# Patient Record
Sex: FEMALE | Race: OTHER | NOT HISPANIC OR LATINO
[De-identification: names, ages, dates, MRNs, and addresses within clinical notes are randomized per-mention and may not be internally consistent; named-entity substitution may affect disease eponyms.]

---

## 2017-02-27 ENCOUNTER — TRANSCRIPTION ENCOUNTER (OUTPATIENT)
Age: 42
End: 2017-02-27

## 2017-05-03 ENCOUNTER — TRANSCRIPTION ENCOUNTER (OUTPATIENT)
Age: 42
End: 2017-05-03

## 2018-01-11 ENCOUNTER — TRANSCRIPTION ENCOUNTER (OUTPATIENT)
Age: 43
End: 2018-01-11

## 2018-05-28 ENCOUNTER — TRANSCRIPTION ENCOUNTER (OUTPATIENT)
Age: 43
End: 2018-05-28

## 2019-12-12 ENCOUNTER — TRANSCRIPTION ENCOUNTER (OUTPATIENT)
Age: 44
End: 2019-12-12

## 2020-05-17 ENCOUNTER — TRANSCRIPTION ENCOUNTER (OUTPATIENT)
Age: 45
End: 2020-05-17

## 2021-11-10 ENCOUNTER — RESULT REVIEW (OUTPATIENT)
Age: 46
End: 2021-11-10

## 2022-06-13 ENCOUNTER — EMERGENCY (EMERGENCY)
Facility: HOSPITAL | Age: 47
LOS: 0 days | Discharge: ANOTHER HOSPITAL | End: 2022-06-14
Attending: STUDENT IN AN ORGANIZED HEALTH CARE EDUCATION/TRAINING PROGRAM | Admitting: STUDENT IN AN ORGANIZED HEALTH CARE EDUCATION/TRAINING PROGRAM
Payer: COMMERCIAL

## 2022-06-13 VITALS
OXYGEN SATURATION: 100 % | TEMPERATURE: 98 F | RESPIRATION RATE: 18 BRPM | DIASTOLIC BLOOD PRESSURE: 57 MMHG | SYSTOLIC BLOOD PRESSURE: 121 MMHG | HEART RATE: 97 BPM

## 2022-06-13 VITALS
RESPIRATION RATE: 18 BRPM | OXYGEN SATURATION: 99 % | HEART RATE: 80 BPM | DIASTOLIC BLOOD PRESSURE: 70 MMHG | WEIGHT: 220.02 LBS | SYSTOLIC BLOOD PRESSURE: 121 MMHG | TEMPERATURE: 99 F

## 2022-06-13 DIAGNOSIS — R10.9 UNSPECIFIED ABDOMINAL PAIN: ICD-10-CM

## 2022-06-13 DIAGNOSIS — U07.1 COVID-19: ICD-10-CM

## 2022-06-13 DIAGNOSIS — Z98.84 BARIATRIC SURGERY STATUS: ICD-10-CM

## 2022-06-13 DIAGNOSIS — R11.2 NAUSEA WITH VOMITING, UNSPECIFIED: ICD-10-CM

## 2022-06-13 DIAGNOSIS — K95.89 OTHER COMPLICATIONS OF OTHER BARIATRIC PROCEDURE: ICD-10-CM

## 2022-06-13 LAB
ALBUMIN SERPL ELPH-MCNC: 3.9 G/DL — SIGNIFICANT CHANGE UP (ref 3.5–5.2)
ALP SERPL-CCNC: 206 U/L — HIGH (ref 30–115)
ALT FLD-CCNC: 24 U/L — SIGNIFICANT CHANGE UP (ref 0–41)
ANION GAP SERPL CALC-SCNC: 19 MMOL/L — HIGH (ref 7–14)
APPEARANCE UR: ABNORMAL
APTT BLD: 30.8 SEC — SIGNIFICANT CHANGE UP (ref 27–39.2)
AST SERPL-CCNC: 35 U/L — SIGNIFICANT CHANGE UP (ref 0–41)
BACTERIA # UR AUTO: NEGATIVE — SIGNIFICANT CHANGE UP
BASOPHILS # BLD AUTO: 0.03 K/UL — SIGNIFICANT CHANGE UP (ref 0–0.2)
BASOPHILS NFR BLD AUTO: 0.3 % — SIGNIFICANT CHANGE UP (ref 0–1)
BILIRUB SERPL-MCNC: 1.2 MG/DL — SIGNIFICANT CHANGE UP (ref 0.2–1.2)
BILIRUB UR-MCNC: ABNORMAL
BUN SERPL-MCNC: 22 MG/DL — HIGH (ref 10–20)
CALCIUM SERPL-MCNC: 9.3 MG/DL — SIGNIFICANT CHANGE UP (ref 8.5–10.1)
CHLORIDE SERPL-SCNC: 96 MMOL/L — LOW (ref 98–110)
CO2 SERPL-SCNC: 21 MMOL/L — SIGNIFICANT CHANGE UP (ref 17–32)
COLOR SPEC: YELLOW — SIGNIFICANT CHANGE UP
CREAT SERPL-MCNC: 1.2 MG/DL — SIGNIFICANT CHANGE UP (ref 0.7–1.5)
DIFF PNL FLD: NEGATIVE — SIGNIFICANT CHANGE UP
EGFR: 57 ML/MIN/1.73M2 — LOW
EOSINOPHIL # BLD AUTO: 0.03 K/UL — SIGNIFICANT CHANGE UP (ref 0–0.7)
EOSINOPHIL NFR BLD AUTO: 0.3 % — SIGNIFICANT CHANGE UP (ref 0–8)
EPI CELLS # UR: 8 /HPF — HIGH (ref 0–5)
GLUCOSE SERPL-MCNC: 118 MG/DL — HIGH (ref 70–99)
GLUCOSE UR QL: NEGATIVE — SIGNIFICANT CHANGE UP
HCG SERPL QL: NEGATIVE — SIGNIFICANT CHANGE UP
HCT VFR BLD CALC: 34.2 % — LOW (ref 37–47)
HGB BLD-MCNC: 11.5 G/DL — LOW (ref 12–16)
HYALINE CASTS # UR AUTO: 3 /LPF — SIGNIFICANT CHANGE UP (ref 0–7)
IMM GRANULOCYTES NFR BLD AUTO: 0.4 % — HIGH (ref 0.1–0.3)
INR BLD: 1.42 RATIO — HIGH (ref 0.65–1.3)
KETONES UR-MCNC: NEGATIVE — SIGNIFICANT CHANGE UP
LACTATE SERPL-SCNC: 1.5 MMOL/L — SIGNIFICANT CHANGE UP (ref 0.7–2)
LEUKOCYTE ESTERASE UR-ACNC: ABNORMAL
LIDOCAIN IGE QN: 42 U/L — SIGNIFICANT CHANGE UP (ref 7–60)
LYMPHOCYTES # BLD AUTO: 0.64 K/UL — LOW (ref 1.2–3.4)
LYMPHOCYTES # BLD AUTO: 5.8 % — LOW (ref 20.5–51.1)
MCHC RBC-ENTMCNC: 28.8 PG — SIGNIFICANT CHANGE UP (ref 27–31)
MCHC RBC-ENTMCNC: 33.6 G/DL — SIGNIFICANT CHANGE UP (ref 32–37)
MCV RBC AUTO: 85.7 FL — SIGNIFICANT CHANGE UP (ref 81–99)
MONOCYTES # BLD AUTO: 0.7 K/UL — HIGH (ref 0.1–0.6)
MONOCYTES NFR BLD AUTO: 6.4 % — SIGNIFICANT CHANGE UP (ref 1.7–9.3)
NEUTROPHILS # BLD AUTO: 9.52 K/UL — HIGH (ref 1.4–6.5)
NEUTROPHILS NFR BLD AUTO: 86.8 % — HIGH (ref 42.2–75.2)
NITRITE UR-MCNC: NEGATIVE — SIGNIFICANT CHANGE UP
NRBC # BLD: 0 /100 WBCS — SIGNIFICANT CHANGE UP (ref 0–0)
PH UR: 6 — SIGNIFICANT CHANGE UP (ref 5–8)
PLATELET # BLD AUTO: 128 K/UL — LOW (ref 130–400)
POTASSIUM SERPL-MCNC: 4.4 MMOL/L — SIGNIFICANT CHANGE UP (ref 3.5–5)
POTASSIUM SERPL-SCNC: 4.4 MMOL/L — SIGNIFICANT CHANGE UP (ref 3.5–5)
PROT SERPL-MCNC: 7.6 G/DL — SIGNIFICANT CHANGE UP (ref 6–8)
PROT UR-MCNC: ABNORMAL
PROTHROM AB SERPL-ACNC: 16.3 SEC — HIGH (ref 9.95–12.87)
RBC # BLD: 3.99 M/UL — LOW (ref 4.2–5.4)
RBC # FLD: 13.4 % — SIGNIFICANT CHANGE UP (ref 11.5–14.5)
RBC CASTS # UR COMP ASSIST: 2 /HPF — SIGNIFICANT CHANGE UP (ref 0–4)
SARS-COV-2 RNA SPEC QL NAA+PROBE: DETECTED
SODIUM SERPL-SCNC: 136 MMOL/L — SIGNIFICANT CHANGE UP (ref 135–146)
SP GR SPEC: 1.02 — SIGNIFICANT CHANGE UP (ref 1.01–1.03)
UROBILINOGEN FLD QL: ABNORMAL
WBC # BLD: 10.96 K/UL — HIGH (ref 4.8–10.8)
WBC # FLD AUTO: 10.96 K/UL — HIGH (ref 4.8–10.8)
WBC UR QL: 8 /HPF — HIGH (ref 0–5)

## 2022-06-13 PROCEDURE — 99283 EMERGENCY DEPT VISIT LOW MDM: CPT

## 2022-06-13 PROCEDURE — 74177 CT ABD & PELVIS W/CONTRAST: CPT | Mod: 26,MA

## 2022-06-13 PROCEDURE — 99285 EMERGENCY DEPT VISIT HI MDM: CPT

## 2022-06-13 PROCEDURE — 93010 ELECTROCARDIOGRAM REPORT: CPT

## 2022-06-13 RX ORDER — DIATRIZOATE MEGLUMINE 180 MG/ML
30 INJECTION, SOLUTION INTRAVESICAL ONCE
Refills: 0 | Status: COMPLETED | OUTPATIENT
Start: 2022-06-13 | End: 2022-06-13

## 2022-06-13 RX ORDER — SODIUM CHLORIDE 9 MG/ML
1000 INJECTION, SOLUTION INTRAVENOUS ONCE
Refills: 0 | Status: COMPLETED | OUTPATIENT
Start: 2022-06-13 | End: 2022-06-13

## 2022-06-13 RX ORDER — MORPHINE SULFATE 50 MG/1
4 CAPSULE, EXTENDED RELEASE ORAL ONCE
Refills: 0 | Status: DISCONTINUED | OUTPATIENT
Start: 2022-06-13 | End: 2022-06-13

## 2022-06-13 RX ORDER — ONDANSETRON 8 MG/1
4 TABLET, FILM COATED ORAL ONCE
Refills: 0 | Status: COMPLETED | OUTPATIENT
Start: 2022-06-13 | End: 2022-06-13

## 2022-06-13 RX ORDER — PIPERACILLIN AND TAZOBACTAM 4; .5 G/20ML; G/20ML
3.38 INJECTION, POWDER, LYOPHILIZED, FOR SOLUTION INTRAVENOUS ONCE
Refills: 0 | Status: COMPLETED | OUTPATIENT
Start: 2022-06-13 | End: 2022-06-13

## 2022-06-13 RX ORDER — PANTOPRAZOLE SODIUM 20 MG/1
40 TABLET, DELAYED RELEASE ORAL ONCE
Refills: 0 | Status: COMPLETED | OUTPATIENT
Start: 2022-06-13 | End: 2022-06-13

## 2022-06-13 RX ADMIN — PIPERACILLIN AND TAZOBACTAM 200 GRAM(S): 4; .5 INJECTION, POWDER, LYOPHILIZED, FOR SOLUTION INTRAVENOUS at 20:43

## 2022-06-13 RX ADMIN — ONDANSETRON 4 MILLIGRAM(S): 8 TABLET, FILM COATED ORAL at 20:43

## 2022-06-13 RX ADMIN — DIATRIZOATE MEGLUMINE 30 MILLILITER(S): 180 INJECTION, SOLUTION INTRAVESICAL at 13:44

## 2022-06-13 RX ADMIN — SODIUM CHLORIDE 1000 MILLILITER(S): 9 INJECTION, SOLUTION INTRAVENOUS at 13:14

## 2022-06-13 RX ADMIN — SODIUM CHLORIDE 1000 MILLILITER(S): 9 INJECTION, SOLUTION INTRAVENOUS at 14:42

## 2022-06-13 RX ADMIN — MORPHINE SULFATE 4 MILLIGRAM(S): 50 CAPSULE, EXTENDED RELEASE ORAL at 13:59

## 2022-06-13 RX ADMIN — MORPHINE SULFATE 4 MILLIGRAM(S): 50 CAPSULE, EXTENDED RELEASE ORAL at 20:44

## 2022-06-13 RX ADMIN — MORPHINE SULFATE 4 MILLIGRAM(S): 50 CAPSULE, EXTENDED RELEASE ORAL at 13:14

## 2022-06-13 RX ADMIN — SODIUM CHLORIDE 1000 MILLILITER(S): 9 INJECTION, SOLUTION INTRAVENOUS at 16:24

## 2022-06-13 RX ADMIN — ONDANSETRON 4 MILLIGRAM(S): 8 TABLET, FILM COATED ORAL at 13:14

## 2022-06-13 RX ADMIN — SODIUM CHLORIDE 1000 MILLILITER(S): 9 INJECTION, SOLUTION INTRAVENOUS at 17:46

## 2022-06-13 NOTE — ED PROVIDER NOTE - CLINICAL SUMMARY MEDICAL DECISION MAKING FREE TEXT BOX
will admit for post op complications. pt seen by [facility] bariatric team. case c/w [facility] surgical team by previous ED team and they accept pt for transfer. pt in stable condition for transfer.

## 2022-06-13 NOTE — CONSULT NOTE ADULT - ASSESSMENT
Assessment  47F PSH of gastric sleeve 5/18 ([facility] Dr. Mandel) presenting with 3 day history abdominal pain worst L flank area radiating up and down her back with associated NBNB emesis, no F/chills, inability to tolerate PO intake    Plan  - transfer to [facility]  - patient hemodynamically stable, afebrile no constitutional signs of sepsis   - accepting physician Dr. Mandel  - d/w Dr. Azevedo, Dr Alfaro

## 2022-06-13 NOTE — ED PROVIDER NOTE - ATTENDING CONTRIBUTION TO CARE
47-year-old female recent history of gastric sleeve surgery  on 5/18/2022 at [facility]  presenting for evaluation of left upper quadrant pain associated with nausea and vomiting for the past several days.  Patient states that she has not  felt well since surgery; shortly thereafter developed COVID, symptoms have resolved since.  Now anything she eats or drinks causes nausea and dry heaving as well as abdominal pain.  Denies any diarrhea or bloody stools,  she is passing gas from below,   last bowel movement was 3 days ago.  Denies any associated fever/ chills, chest pain/ shortness of breath or any other acute complaints. Well-nourished, well-appearing female sitting in a chair in no acute distress, PERRL, pain conjunctiva, mmm, normal work of breathing, speaking full sentences,  lungs CTA b/l, abdomen and is nondistended, +left upper quadrant tenderness to palpation, BS present, no pitting leg edema, awake and alert x3, no gross neuro deficits.  Plan: hydration, antiemetics, labs, CT abdomen pelvis, surgery consult, reassess.

## 2022-06-13 NOTE — ED PROVIDER NOTE - NS ED ROS FT
Review of Systems:  CONSTITUTIONAL: No fever, No diaphoresis, No generalized weakness  SKIN: No rash  HEMATOLOGIC: No abnormal bleeding or bruising  EYES: No eye pain, No blurred vision  ENT: No change in hearing, No sore throat, No neck pain, No rhinorrhea, No ear pain  RESPIRATORY: No shortness of breath, No cough  CARDIAC: No chest pain, No palpitations  GI:  + abdominal pain, + nausea, + vomiting, No diarrhea, No constipation, No bright red blood per rectum or melena. No flank pain  : No dysuria, frequency, hematuria  MUSCULOSKELETAL: No joint paint, No swelling, No back pain  NEUROLOGIC: No numbness, No focal weakness, No headache, No dizziness  All other systems negative, unless specified in HPI

## 2022-06-13 NOTE — CONSULT NOTE ADULT - SUBJECTIVE AND OBJECTIVE BOX
ZENON MEADOWS [id number]  47y Female    HPI: 47F PSH of gastric sleeve  ([facility] Dr. Mandel) presenting with 3 day history abdominal pain worst L flank area radiating up and down her back with associated NBNB emesis 6-10x/day. Patient states that her pain started all of a sudden on Friday night, she was eating her normal soft diet, and did not eat anything out of the ordinary. She does not have any sick contacts. No F/chills. Last BM was 4 days prior to arrival, however patient states normally she only had approx 3BM/week. Denies NSAID use or EtOH use. Of note, patient did test positive for COVID 4 days post operatively, and recovered only approx 1 week prior to arrival.    PAST MEDICAL & SURGICAL HISTORY:    MEDICATIONS  (STANDING):  piperacillin/tazobactam IVPB... 3.375 Gram(s) IV Intermittent once    MEDICATIONS  (PRN):    Allergies  No Known Allergies  Intolerances    REVIEW OF SYSTEMS  [x ] A ten-point review of systems was otherwise negative except as noted.  [ ] Due to altered mental status/intubation, subjective information were not able to be obtained from the patient. History was obtained, to the extent possible, from review of the chart and collateral sources of information.    Vital Signs Last 24 Hrs  T(C): 36.7 (2022 15:36), Max: 37.2 (2022 11:53)  T(F): 98.1 (2022 15:36), Max: 98.9 (2022 11:53)  HR: 97 (2022 15:36) (80 - 97)  BP: 121/57 (2022 15:36) (121/57 - 121/70)  BP(mean): --  RR: 18 (2022 15:36) (18 - 18)  SpO2: 100% (2022 15:36) (99% - 100%)    PHYSICAL EXAM:  GENERAL: NAD, well-appearing  CHEST/LUNG: Clear to auscultation bilaterally  HEART: Regular rate and rhythm  ABDOMEN: Soft, tender Left hemiabdomen, no rebound or guarding  EXTREMITIES:  No clubbing, cyanosis, or edema    Labs:  CAPILLARY BLOOD GLUCOSE                  11.5   10.96 )-----------( 128      ( 2022 13:24 )             34.2       Auto Neutrophil %: 86.8 % (22 @ 13:24)  Auto Immature Granulocyte %: 0.4 % (22 @ 13:24)      136  |  96<L>  |  22<H>  ----------------------------<  118<H>  4.4   |  21  |  1.2  Calcium, Total Serum: 9.3 mg/dL (22 @ 13:24)    LFTs:          7.6  | 1.2  | 35       ------------------[206     ( 2022 13:24 )  3.9  | x    | 24          Lipase:42     Amylase:x      Lactate, Blood: 1.5 mmol/L (22 @ 13:24)    Coags:  16.30  ----< 1.42    ( 2022 13:24 )     30.8     Urinalysis Basic - ( 2022 16:15 )    Color: Yellow / Appearance: Slightly Turbid / S.023 / pH: x  Gluc: x / Ketone: Negative  / Bili: Small / Urobili: 6 mg/dL   Blood: x / Protein: 30 mg/dL / Nitrite: Negative   Leuk Esterase: Small / RBC: 2 /HPF / WBC 8 /HPF   Sq Epi: x / Non Sq Epi: 8 /HPF / Bacteria: Negative    RADIOLOGY & ADDITIONAL STUDIES:  < from: CT Abdomen and Pelvis w/ Oral Cont and w/ IV Cont (22 @ 17:09) >  IMPRESSION:  Prior sleeve gastrectomy with irregularity of the suture line and   adjacent fluid collection measuring up to 6.2 cm. Findings suspicious for   contained leak and/or abscess formation.  Findings discussed with Tina Leon at 6:53 PM on 2022  < end of copied text >     ZENON MEADOWS [id number]  47y Female    HPI: 47F PSH of gastric sleeve  ([facility] Dr. Mandel) presenting with 3 day history abdominal pain worst L flank area radiating up and down her back with associated NBNB emesis 6-10x/day. Patient states that her pain started all of a sudden on Friday night, she was eating her normal soft diet, and did not eat anything out of the ordinary. She does not have any sick contacts. No F/chills. Last BM was 4 days prior to arrival, however patient states normally she only had approx 3BM/week. Denies NSAID use or EtOH use. Of note, patient did test positive for COVID 4 days post operatively, and recovered only approx 1 week prior to arrival.    PAST MEDICAL & SURGICAL HISTORY:    MEDICATIONS  (STANDING):  piperacillin/tazobactam IVPB... 3.375 Gram(s) IV Intermittent once    MEDICATIONS  (PRN):    Allergies  No Known Allergies  Intolerances    REVIEW OF SYSTEMS  [x ] A ten-point review of systems was otherwise negative except as noted.  [ ] Due to altered mental status/intubation, subjective information were not able to be obtained from the patient. History was obtained, to the extent possible, from review of the chart and collateral sources of information.    Vital Signs Last 24 Hrs  T(C): 36.7 (2022 15:36), Max: 37.2 (2022 11:53)  T(F): 98.1 (2022 15:36), Max: 98.9 (2022 11:53)  HR: 97 (2022 15:36) (80 - 97)  BP: 121/57 (2022 15:36) (121/57 - 121/70)  BP(mean): --  RR: 18 (2022 15:36) (18 - 18)  SpO2: 100% (2022 15:36) (99% - 100%)    PHYSICAL EXAM:  GENERAL: NAD, well-appearing  CHEST/LUNG: Clear to auscultation bilaterally  HEART: Regular rate and rhythm  ABDOMEN: Soft, tender Left hemiabdomen, no rebound or guarding  EXTREMITIES:  No clubbing, cyanosis, or edema    Labs:  CAPILLARY BLOOD GLUCOSE                  11.5   10.96 )-----------( 128      ( 2022 13:24 )             34.2       Auto Neutrophil %: 86.8 % (22 @ 13:24)  Auto Immature Granulocyte %: 0.4 % (22 @ 13:24)      136  |  96<L>  |  22<H>  ----------------------------<  118<H>  4.4   |  21  |  1.2  Calcium, Total Serum: 9.3 mg/dL (22 @ 13:24)    LFTs:          7.6  | 1.2  | 35       ------------------[206     ( 2022 13:24 )  3.9  | x    | 24          Lipase:42     Amylase:x      Lactate, Blood: 1.5 mmol/L (22 @ 13:24)    Coags:  16.30  ----< 1.42    ( 2022 13:24 )     30.8     Urinalysis Basic - ( 2022 16:15 )    Color: Yellow / Appearance: Slightly Turbid / S.023 / pH: x  Gluc: x / Ketone: Negative  / Bili: Small / Urobili: 6 mg/dL   Blood: x / Protein: 30 mg/dL / Nitrite: Negative   Leuk Esterase: Small / RBC: 2 /HPF / WBC 8 /HPF   Sq Epi: x / Non Sq Epi: 8 /HPF / Bacteria: Negative    RADIOLOGY & ADDITIONAL STUDIES:  < from: CT Abdomen and Pelvis w/ Oral Cont and w/ IV Cont (22 @ 17:09) >  IMPRESSION:  Prior sleeve gastrectomy with irregularity of the suture line and   adjacent fluid collection measuring up to 6.2 cm. Findings suspicious for   contained leak and/or abscess formation.  Findings discussed with Tina Leon at 6:53 PM on 2022  < end of copied text >

## 2022-06-13 NOTE — ED PROVIDER NOTE - CARE PLAN
Principal Discharge DX:	Nausea & vomiting  Secondary Diagnosis:	Post-operative complication  Secondary Diagnosis:	Abdominal pain  Secondary Diagnosis:	2019 novel coronavirus disease (COVID-19)   1

## 2022-06-13 NOTE — ED PROVIDER NOTE - PROGRESS NOTE DETAILS
AH - Surgery consulted, pending CT ED: The patient is tolerating oral contrast, Case endorsed to Dr. Hancock to follow-up labs, imaging, surgery evaluation, reassess and dispo. MADDIE BLAKELY: received sign out on this pt. Surgical team discussed acceptance of pt at [facility], as per surgeon Dr. Mandel there ([phone]). disucssed with [facility] ED--pt is accepted as an ER to ER transfer under Dr. Alirio Bravo. Discussed with charge here, will set up transport for this pt. transfer paperwork complete.  here made aware. Rn here made aware. pt s/o Dr. Smith pending transport to [facility]. hemodynamically stable at this time.

## 2022-06-13 NOTE — ED PROVIDER NOTE - PHYSICAL EXAMINATION
CONSTITUTIONAL: in mod acute distress, afebrile  SKIN: Warm, dry  HEAD: Normocephalic; atraumatic  EYES: No conjunctival injection. EOMI  ENT: No nasal discharge; oropharynx nonerythematous; airway clear  CARD:  Regular rate and rhythm  RESP: CTAB  ABD: Soft non distended, moderate TTP diffuse, no cva or flank TTP; No guarding or rebound tenderness  EXT: Normal ROM.  No clubbing or cyanosis.  No edema  NEURO: A&O x3, grossly unremarkable, no focal deficits  PSYCH: Cooperative, appropriate  *Chaperone was used during the encounter. A professional environment was maintained and discussed with patient
No

## 2022-06-13 NOTE — ED PROVIDER NOTE - NSREASONFORTRANSFER_ED_A_ED
Higher Level of Care or Service Not Available/Pre-existing Relationship/Patient Request/Consultant Request

## 2022-06-13 NOTE — CONSULT NOTE ADULT - ATTENDING COMMENTS
48yo female s/p sleeve gastrectomy 5/18/2022 ([facility]) presenting to ER with abdominal pain and emesis, no fever/chills, chest pain or shortness of breath. +bowel function. On exam, patient has left sided tenderness without peritonitis. Labs reviewed - WBC 10.96 ,lactate 1.5. CT A/P without PO contrast demonstrates irregularity of suture line and adjacent fluid collection 6.2cm suspicious for contained leak and/or abscess formation. Recommend transfer to [facility], will contact Dr. Mandel. If not able to transfer, then will admit for IV antibiotics, serial exams, NPO, IV fluids.

## 2022-06-13 NOTE — ED PROVIDER NOTE - OBJECTIVE STATEMENT
46 yo F with PMH gastric sleeve 5/18/22 at [facility] with Dr Clarke Mandel who presents with abd pain x3 days.  Diffuse, non radiating, associated with nausea and vomiting, NBNB.  Last BM 3 days ago.  Pt denies fevers, chills, chest pain, SOB, back pain, rash, diarrhea.

## 2022-06-14 RX ADMIN — PANTOPRAZOLE SODIUM 40 MILLIGRAM(S): 20 TABLET, DELAYED RELEASE ORAL at 00:06

## 2022-06-16 ENCOUNTER — INPATIENT (INPATIENT)
Facility: HOSPITAL | Age: 47
LOS: 6 days | Discharge: HOME | End: 2022-06-23
Attending: INTERNAL MEDICINE | Admitting: INTERNAL MEDICINE
Payer: COMMERCIAL

## 2022-06-16 VITALS
HEART RATE: 68 BPM | TEMPERATURE: 98 F | DIASTOLIC BLOOD PRESSURE: 83 MMHG | RESPIRATION RATE: 18 BRPM | SYSTOLIC BLOOD PRESSURE: 147 MMHG

## 2022-06-16 LAB
-  AMIKACIN: SIGNIFICANT CHANGE UP
-  AMOXICILLIN/CLAVULANIC ACID: SIGNIFICANT CHANGE UP
-  AMPICILLIN/SULBACTAM: SIGNIFICANT CHANGE UP
-  AMPICILLIN: SIGNIFICANT CHANGE UP
-  AZTREONAM: SIGNIFICANT CHANGE UP
-  CEFAZOLIN: SIGNIFICANT CHANGE UP
-  CEFEPIME: SIGNIFICANT CHANGE UP
-  CEFOXITIN: SIGNIFICANT CHANGE UP
-  CEFTRIAXONE: SIGNIFICANT CHANGE UP
-  CIPROFLOXACIN: SIGNIFICANT CHANGE UP
-  ERTAPENEM: SIGNIFICANT CHANGE UP
-  GENTAMICIN: SIGNIFICANT CHANGE UP
-  LEVOFLOXACIN: SIGNIFICANT CHANGE UP
-  MEROPENEM: SIGNIFICANT CHANGE UP
-  NITROFURANTOIN: SIGNIFICANT CHANGE UP
-  PIPERACILLIN/TAZOBACTAM: SIGNIFICANT CHANGE UP
-  TOBRAMYCIN: SIGNIFICANT CHANGE UP
-  TRIMETHOPRIM/SULFAMETHOXAZOLE: SIGNIFICANT CHANGE UP
CULTURE RESULTS: SIGNIFICANT CHANGE UP
METHOD TYPE: SIGNIFICANT CHANGE UP
ORGANISM # SPEC MICROSCOPIC CNT: SIGNIFICANT CHANGE UP
ORGANISM # SPEC MICROSCOPIC CNT: SIGNIFICANT CHANGE UP
SPECIMEN SOURCE: SIGNIFICANT CHANGE UP

## 2022-06-16 RX ORDER — PREGABALIN 225 MG/1
1 CAPSULE ORAL
Qty: 0 | Refills: 0 | DISCHARGE

## 2022-06-16 RX ORDER — MORPHINE SULFATE 50 MG/1
2 CAPSULE, EXTENDED RELEASE ORAL ONCE
Refills: 0 | Status: COMPLETED | OUTPATIENT
Start: 2022-06-16 | End: 2022-06-16

## 2022-06-16 RX ORDER — ONDANSETRON 8 MG/1
4 TABLET, FILM COATED ORAL ONCE
Refills: 0 | Status: COMPLETED | OUTPATIENT
Start: 2022-06-16 | End: 2022-06-16

## 2022-06-16 RX ORDER — SODIUM CHLORIDE 9 MG/ML
1000 INJECTION, SOLUTION INTRAVENOUS
Refills: 0 | Status: DISCONTINUED | OUTPATIENT
Start: 2022-06-16 | End: 2022-06-17

## 2022-06-16 RX ORDER — LOSARTAN POTASSIUM 100 MG/1
1 TABLET, FILM COATED ORAL
Qty: 0 | Refills: 0 | DISCHARGE

## 2022-06-16 RX ADMIN — ONDANSETRON 4 MILLIGRAM(S): 8 TABLET, FILM COATED ORAL at 22:05

## 2022-06-16 RX ADMIN — SODIUM CHLORIDE 60 MILLILITER(S): 9 INJECTION, SOLUTION INTRAVENOUS at 21:57

## 2022-06-16 NOTE — H&P ADULT - NSHPLABSRESULTS_GEN_ALL_CORE
< from: CT Abdomen and Pelvis w/ Oral Cont and w/ IV Cont (06.13.22 @ 17:09) >      IMPRESSION:    Prior sleeve gastrectomy with irregularity of the suture line and   adjacent fluid collection measuring up to 6.2 cm. Findings suspicious for   contained leak and/or abscess formation.    Findings discussed with Tina Leon at 6:53 PM on 6/13/2022    < end of copied text > < from: CT Abdomen and Pelvis w/ Oral Cont and w/ IV Cont (06.13.22 @ 17:09) >      IMPRESSION:    Prior sleeve gastrectomy with irregularity of the suture line and   adjacent fluid collection measuring up to 6.2 cm. Findings suspicious for   contained leak and/or abscess formation.      < end of copied text >

## 2022-06-16 NOTE — H&P ADULT - HISTORY OF PRESENT ILLNESS
46 yo F with PMH gastric sleeve 5/18/22 at [facility] with Dr Clarke Mandel who presents with abd pain for a week . The patient was transferred from [facility] after CT scan showed fluid collection suspicious for contained leak .the patient originally presented here and was transferred to [facility] then transferred back after ct scan findings.  the pain started friday night is Diffuse, non radiating, associated with nausea and vomiting, NBNB ,and constipation, Last BM today.   The patient states that the pain has been intermittent  ,severity 8/10 .with severe nausea and vomiting    Pt denies fevers, chills, chest pain, SOB, back pain, rash, or diarrhea.  The patient reports she tested positive for covid postoperatively ,and again on this admission ,pt is asymptomatic on RA.    The patient is hemodynamically stable ,afebrile  Labs WBC 10k ,Cr 1.2 (unknown baseline)  CT< from: CT Abdomen and Pelvis w/ Oral Cont and w/ IV Cont (06.13.22 @ 17:09) >  Prior sleeve gastrectomy with irregularity of the suture line and   adjacent fluid collection measuring up to 6.2 cm. Findings suspicious for   contained leak and/or abscess formation.         48 yo F with PMH gastric sleeve 5/18/22 at [facility] with Dr Clarke Mandel who presents with abd pain for a week . The patient was transferred from [facility] after CT scan showed fluid collection suspicious for contained leak .the patient originally presented here and was transferred to [facility] then transferred back after ct scan findings.  the pain started 6/10 is Diffuse, non radiating, associated with nausea and vomiting, NBNB ,and constipation, Last BM today.   The patient states that the pain has been intermittent  ,severity 8/10 .with severe nausea and vomiting    Pt denies fevers, chills, chest pain, SOB, back pain, rash, or diarrhea.  The patient reports she tested positive for covid postoperatively ,and again on this admission ,pt is asymptomatic on RA.    The patient is hemodynamically stable ,afebrile  Labs WBC 10k ,Cr 1.2 (unknown baseline)  CT< from: CT Abdomen and Pelvis w/ Oral Cont and w/ IV Cont (06.13.22 @ 17:09) >  Prior sleeve gastrectomy with irregularity of the suture line and   adjacent fluid collection measuring up to 6.2 cm. Findings suspicious for   contained leak and/or abscess formation.

## 2022-06-16 NOTE — H&P ADULT - NSHPPHYSICALEXAM_GEN_ALL_CORE
GENERAL: NAD, lying in bed comfortably ,on RA  HEAD:  Atraumatic, Normocephalic  EYES: EOMI, PERRLA, conjunctiva and sclera clear  ENT: Moist mucous membranes  CHEST/LUNG: Clear to auscultation bilaterally  HEART: Regular rate and rhythm; No murmur  ABDOMEN: Soft, Nontender, Nondistended.   EXTREMITIES:  No clubbing, cyanosis, or edema  NERVOUS SYSTEM:  Alert & Oriented X3, speech clear.   SKIN: No rashes or lesions

## 2022-06-16 NOTE — H&P ADULT - ASSESSMENT
48 yo F with PMH gastric sleeve 5/18/22 at [facility] with Dr Clarke Mandel who presents with abd pain for a week . The patient was transferred from [facility] after CT scan showed fluid collection suspicious for contained leak .the patient originally presented here and was transferred to [facility] then transferred back after ct scan findings.  the pain started friday night is Diffuse, non radiating, associated with nausea and vomiting, NBNB ,and constipation, Last BM today.     #Diffuse abdominal pain secondary to contained leak ,sp gastric sleeve surgery a month ago at [facility]  -hemodynamically stable ,afebrile ,wbc 10k  -transferred from [facility] for concern of contained leak  -sp gastric sleeve 5/18/22 at [facility] with Dr Clarke Mandel   -CT 6/13/22 Prior sleeve gastrectomy with irregularity of the suture line and   adjacent fluid collection measuring up to 6.2 cm. Findings suspicious for   contained leak and/or abscess formation.  -severe diffuse abdominal pain 8/10 ,with nausea and vomiting  - Zofran and morphine IV PRN   -started on Rocephin and flagyl  -NPO ,IVF  -consult surgery   -consult GI for EGD?    #COVID, asymptomatic  - no SOB ,cough or chest pain  - on RA     #HTN  -on home losartan 100mg ,hold for now    #DVT px lovenox  #GI px PPI  #Diet NPO  #Disposition admit to medicine  #Code full  #Handoff f/u surgery and GI

## 2022-06-16 NOTE — H&P ADULT - NSHPSOCIALHISTORY_GEN_ALL_CORE
Lives with daughter ,non smoker.  works as school safety agent. Lives with daughter ,non smoker.  Denies alcohol, ilicit drug use  works as school safety agent.

## 2022-06-16 NOTE — PATIENT PROFILE ADULT - FALL HARM RISK - HARM RISK INTERVENTIONS

## 2022-06-16 NOTE — H&P ADULT - ATTENDING COMMENTS
Pt seen and examined independently at bedside.    Pt initally present on 6/13 to [facility] for evaluation of abdominal pain. She was found to have a gastric sleeve leak. She was transferred to [facility] which is where she had the sleeve done for evaluation. As per pt nothing else was done but a PICC line was placed. Pt was then transferred back to [facility] (presumably for EGD?). Currently awaiting medical records from [facility] to confirm above information.    Pt reports vomiting/nausea/pain x 1 week. Pt not septic on admission. No signs of peritonitis. Pt is to go to EGD today with possible suturing. Pt is to remain NPO. Nutrition eval requested. Medical team spoke with Dr. [facility] who will start patient on TPN until leak heals. Check b12, folate, iron levles. Pt had sleeve 1 month ago. Monitor for nutritional deficiencies. INR slightly elevated to 1.4 which could be due to insuffient vitamin K. Can monitor for now. Had episode of epistaxis this morning which was self limited.     Pt has asymptomatic covid infection. Will continue to monitor for symptoms/vital signs.    #Progress Note Handoff:  Pending (specify):  EGD, TPN, [facility] records  Family discussion: spoke with pt regarding above  Disposition: Home_x__/SNF___/Other________/Unknown at this time________

## 2022-06-17 ENCOUNTER — TRANSCRIPTION ENCOUNTER (OUTPATIENT)
Age: 47
End: 2022-06-17

## 2022-06-17 DIAGNOSIS — Z90.3 ACQUIRED ABSENCE OF STOMACH [PART OF]: Chronic | ICD-10-CM

## 2022-06-17 LAB
ALBUMIN SERPL ELPH-MCNC: 2.9 G/DL — LOW (ref 3.5–5.2)
ALP SERPL-CCNC: 185 U/L — HIGH (ref 30–115)
ALT FLD-CCNC: 14 U/L — SIGNIFICANT CHANGE UP (ref 0–41)
ANION GAP SERPL CALC-SCNC: 16 MMOL/L — HIGH (ref 7–14)
AST SERPL-CCNC: 15 U/L — SIGNIFICANT CHANGE UP (ref 0–41)
BASOPHILS # BLD AUTO: 0.03 K/UL — SIGNIFICANT CHANGE UP (ref 0–0.2)
BASOPHILS NFR BLD AUTO: 0.7 % — SIGNIFICANT CHANGE UP (ref 0–1)
BILIRUB SERPL-MCNC: 0.3 MG/DL — SIGNIFICANT CHANGE UP (ref 0.2–1.2)
BLD GP AB SCN SERPL QL: SIGNIFICANT CHANGE UP
BUN SERPL-MCNC: 5 MG/DL — LOW (ref 10–20)
CALCIUM SERPL-MCNC: 8.3 MG/DL — LOW (ref 8.5–10.1)
CHLORIDE SERPL-SCNC: 108 MMOL/L — SIGNIFICANT CHANGE UP (ref 98–110)
CO2 SERPL-SCNC: 21 MMOL/L — SIGNIFICANT CHANGE UP (ref 17–32)
CREAT SERPL-MCNC: 0.6 MG/DL — LOW (ref 0.7–1.5)
EGFR: 112 ML/MIN/1.73M2 — SIGNIFICANT CHANGE UP
EOSINOPHIL # BLD AUTO: 0.1 K/UL — SIGNIFICANT CHANGE UP (ref 0–0.7)
EOSINOPHIL NFR BLD AUTO: 2.3 % — SIGNIFICANT CHANGE UP (ref 0–8)
GLUCOSE SERPL-MCNC: 98 MG/DL — SIGNIFICANT CHANGE UP (ref 70–99)
HCG UR QL: NEGATIVE — SIGNIFICANT CHANGE UP
HCT VFR BLD CALC: 28.5 % — LOW (ref 37–47)
HGB BLD-MCNC: 9.2 G/DL — LOW (ref 12–16)
IMM GRANULOCYTES NFR BLD AUTO: 0.5 % — HIGH (ref 0.1–0.3)
INR BLD: 1.33 RATIO — HIGH (ref 0.65–1.3)
LYMPHOCYTES # BLD AUTO: 1.09 K/UL — LOW (ref 1.2–3.4)
LYMPHOCYTES # BLD AUTO: 24.9 % — SIGNIFICANT CHANGE UP (ref 20.5–51.1)
MAGNESIUM SERPL-MCNC: 1.4 MG/DL — LOW (ref 1.8–2.4)
MCHC RBC-ENTMCNC: 28.7 PG — SIGNIFICANT CHANGE UP (ref 27–31)
MCHC RBC-ENTMCNC: 32.3 G/DL — SIGNIFICANT CHANGE UP (ref 32–37)
MCV RBC AUTO: 88.8 FL — SIGNIFICANT CHANGE UP (ref 81–99)
MONOCYTES # BLD AUTO: 0.36 K/UL — SIGNIFICANT CHANGE UP (ref 0.1–0.6)
MONOCYTES NFR BLD AUTO: 8.2 % — SIGNIFICANT CHANGE UP (ref 1.7–9.3)
NEUTROPHILS # BLD AUTO: 2.77 K/UL — SIGNIFICANT CHANGE UP (ref 1.4–6.5)
NEUTROPHILS NFR BLD AUTO: 63.4 % — SIGNIFICANT CHANGE UP (ref 42.2–75.2)
NRBC # BLD: 0 /100 WBCS — SIGNIFICANT CHANGE UP (ref 0–0)
PLATELET # BLD AUTO: 129 K/UL — LOW (ref 130–400)
POTASSIUM SERPL-MCNC: 3.3 MMOL/L — LOW (ref 3.5–5)
POTASSIUM SERPL-SCNC: 3.3 MMOL/L — LOW (ref 3.5–5)
PROT SERPL-MCNC: 5.6 G/DL — LOW (ref 6–8)
PROTHROM AB SERPL-ACNC: 15.2 SEC — HIGH (ref 9.95–12.87)
RBC # BLD: 3.21 M/UL — LOW (ref 4.2–5.4)
RBC # FLD: 13.8 % — SIGNIFICANT CHANGE UP (ref 11.5–14.5)
SODIUM SERPL-SCNC: 145 MMOL/L — SIGNIFICANT CHANGE UP (ref 135–146)
WBC # BLD: 4.37 K/UL — LOW (ref 4.8–10.8)
WBC # FLD AUTO: 4.37 K/UL — LOW (ref 4.8–10.8)

## 2022-06-17 PROCEDURE — 71045 X-RAY EXAM CHEST 1 VIEW: CPT | Mod: 26

## 2022-06-17 PROCEDURE — 74240 X-RAY XM UPR GI TRC 1CNTRST: CPT | Mod: 26,22

## 2022-06-17 PROCEDURE — 99223 1ST HOSP IP/OBS HIGH 75: CPT | Mod: 25

## 2022-06-17 PROCEDURE — 99221 1ST HOSP IP/OBS SF/LOW 40: CPT

## 2022-06-17 PROCEDURE — 43236 UPPR GI SCOPE W/SUBMUC INJ: CPT | Mod: XU

## 2022-06-17 PROCEDURE — 99223 1ST HOSP IP/OBS HIGH 75: CPT

## 2022-06-17 RX ORDER — ONDANSETRON 8 MG/1
4 TABLET, FILM COATED ORAL EVERY 8 HOURS
Refills: 0 | Status: DISCONTINUED | OUTPATIENT
Start: 2022-06-17 | End: 2022-06-17

## 2022-06-17 RX ORDER — ONDANSETRON 8 MG/1
4 TABLET, FILM COATED ORAL ONCE
Refills: 0 | Status: COMPLETED | OUTPATIENT
Start: 2022-06-17 | End: 2022-06-18

## 2022-06-17 RX ORDER — PANTOPRAZOLE SODIUM 20 MG/1
40 TABLET, DELAYED RELEASE ORAL DAILY
Refills: 0 | Status: DISCONTINUED | OUTPATIENT
Start: 2022-06-17 | End: 2022-06-17

## 2022-06-17 RX ORDER — MAGNESIUM SULFATE 500 MG/ML
2 VIAL (ML) INJECTION
Refills: 0 | Status: COMPLETED | OUTPATIENT
Start: 2022-06-17 | End: 2022-06-17

## 2022-06-17 RX ORDER — ONDANSETRON 8 MG/1
4 TABLET, FILM COATED ORAL EVERY 8 HOURS
Refills: 0 | Status: DISCONTINUED | OUTPATIENT
Start: 2022-06-17 | End: 2022-06-23

## 2022-06-17 RX ORDER — CHLORHEXIDINE GLUCONATE 213 G/1000ML
1 SOLUTION TOPICAL
Refills: 0 | Status: DISCONTINUED | OUTPATIENT
Start: 2022-06-17 | End: 2022-06-23

## 2022-06-17 RX ORDER — ONDANSETRON 8 MG/1
4 TABLET, FILM COATED ORAL EVERY 4 HOURS
Refills: 0 | Status: DISCONTINUED | OUTPATIENT
Start: 2022-06-17 | End: 2022-06-17

## 2022-06-17 RX ORDER — HYDROMORPHONE HYDROCHLORIDE 2 MG/ML
0.5 INJECTION INTRAMUSCULAR; INTRAVENOUS; SUBCUTANEOUS
Refills: 0 | Status: DISCONTINUED | OUTPATIENT
Start: 2022-06-17 | End: 2022-06-23

## 2022-06-17 RX ORDER — CEFTRIAXONE 500 MG/1
1000 INJECTION, POWDER, FOR SOLUTION INTRAMUSCULAR; INTRAVENOUS EVERY 24 HOURS
Refills: 0 | Status: COMPLETED | OUTPATIENT
Start: 2022-06-17 | End: 2022-06-23

## 2022-06-17 RX ORDER — LOSARTAN POTASSIUM 100 MG/1
100 TABLET, FILM COATED ORAL DAILY
Refills: 0 | Status: DISCONTINUED | OUTPATIENT
Start: 2022-06-17 | End: 2022-06-23

## 2022-06-17 RX ORDER — ENOXAPARIN SODIUM 100 MG/ML
40 INJECTION SUBCUTANEOUS EVERY 24 HOURS
Refills: 0 | Status: DISCONTINUED | OUTPATIENT
Start: 2022-06-17 | End: 2022-06-23

## 2022-06-17 RX ORDER — SODIUM CHLORIDE 9 MG/ML
1000 INJECTION, SOLUTION INTRAVENOUS
Refills: 0 | Status: DISCONTINUED | OUTPATIENT
Start: 2022-06-17 | End: 2022-06-18

## 2022-06-17 RX ORDER — METRONIDAZOLE 500 MG
TABLET ORAL
Refills: 0 | Status: DISCONTINUED | OUTPATIENT
Start: 2022-06-17 | End: 2022-06-23

## 2022-06-17 RX ORDER — ACETAMINOPHEN 500 MG
650 TABLET ORAL EVERY 6 HOURS
Refills: 0 | Status: DISCONTINUED | OUTPATIENT
Start: 2022-06-17 | End: 2022-06-23

## 2022-06-17 RX ORDER — MORPHINE SULFATE 50 MG/1
2 CAPSULE, EXTENDED RELEASE ORAL EVERY 6 HOURS
Refills: 0 | Status: DISCONTINUED | OUTPATIENT
Start: 2022-06-17 | End: 2022-06-23

## 2022-06-17 RX ORDER — HYDROMORPHONE HYDROCHLORIDE 2 MG/ML
1 INJECTION INTRAMUSCULAR; INTRAVENOUS; SUBCUTANEOUS
Refills: 0 | Status: DISCONTINUED | OUTPATIENT
Start: 2022-06-17 | End: 2022-06-22

## 2022-06-17 RX ORDER — PANTOPRAZOLE SODIUM 20 MG/1
40 TABLET, DELAYED RELEASE ORAL EVERY 12 HOURS
Refills: 0 | Status: DISCONTINUED | OUTPATIENT
Start: 2022-06-17 | End: 2022-06-23

## 2022-06-17 RX ORDER — METRONIDAZOLE 500 MG
500 TABLET ORAL ONCE
Refills: 0 | Status: COMPLETED | OUTPATIENT
Start: 2022-06-17 | End: 2022-06-17

## 2022-06-17 RX ORDER — METRONIDAZOLE 500 MG
500 TABLET ORAL EVERY 8 HOURS
Refills: 0 | Status: DISCONTINUED | OUTPATIENT
Start: 2022-06-17 | End: 2022-06-23

## 2022-06-17 RX ORDER — ELECTROLYTE SOLUTION,INJ
1 VIAL (ML) INTRAVENOUS
Refills: 0 | Status: DISCONTINUED | OUTPATIENT
Start: 2022-06-17 | End: 2022-06-17

## 2022-06-17 RX ORDER — POTASSIUM CHLORIDE 20 MEQ
20 PACKET (EA) ORAL
Refills: 0 | Status: COMPLETED | OUTPATIENT
Start: 2022-06-17 | End: 2022-06-17

## 2022-06-17 RX ADMIN — Medication 50 MILLIEQUIVALENT(S): at 11:26

## 2022-06-17 RX ADMIN — LOSARTAN POTASSIUM 100 MILLIGRAM(S): 100 TABLET, FILM COATED ORAL at 14:34

## 2022-06-17 RX ADMIN — ONDANSETRON 4 MILLIGRAM(S): 8 TABLET, FILM COATED ORAL at 02:26

## 2022-06-17 RX ADMIN — Medication 100 MILLIGRAM(S): at 21:03

## 2022-06-17 RX ADMIN — PANTOPRAZOLE SODIUM 40 MILLIGRAM(S): 20 TABLET, DELAYED RELEASE ORAL at 11:33

## 2022-06-17 RX ADMIN — Medication 1 EACH: at 21:04

## 2022-06-17 RX ADMIN — ONDANSETRON 4 MILLIGRAM(S): 8 TABLET, FILM COATED ORAL at 21:04

## 2022-06-17 RX ADMIN — SODIUM CHLORIDE 60 MILLILITER(S): 9 INJECTION, SOLUTION INTRAVENOUS at 14:18

## 2022-06-17 RX ADMIN — ONDANSETRON 4 MILLIGRAM(S): 8 TABLET, FILM COATED ORAL at 14:18

## 2022-06-17 RX ADMIN — CEFTRIAXONE 100 MILLIGRAM(S): 500 INJECTION, POWDER, FOR SOLUTION INTRAMUSCULAR; INTRAVENOUS at 06:43

## 2022-06-17 RX ADMIN — Medication 25 GRAM(S): at 09:45

## 2022-06-17 RX ADMIN — ONDANSETRON 4 MILLIGRAM(S): 8 TABLET, FILM COATED ORAL at 05:21

## 2022-06-17 RX ADMIN — Medication 25 GRAM(S): at 14:15

## 2022-06-17 RX ADMIN — ONDANSETRON 4 MILLIGRAM(S): 8 TABLET, FILM COATED ORAL at 08:17

## 2022-06-17 RX ADMIN — Medication 100 MILLIGRAM(S): at 14:14

## 2022-06-17 RX ADMIN — SODIUM CHLORIDE 60 MILLILITER(S): 9 INJECTION, SOLUTION INTRAVENOUS at 17:43

## 2022-06-17 RX ADMIN — Medication 50 MILLIEQUIVALENT(S): at 21:03

## 2022-06-17 RX ADMIN — CHLORHEXIDINE GLUCONATE 1 APPLICATION(S): 213 SOLUTION TOPICAL at 05:21

## 2022-06-17 RX ADMIN — Medication 100 MILLIGRAM(S): at 02:26

## 2022-06-17 RX ADMIN — Medication 100 MILLIGRAM(S): at 05:21

## 2022-06-17 RX ADMIN — Medication 25 GRAM(S): at 17:44

## 2022-06-17 NOTE — PRE-ANESTHESIA EVALUATION ADULT - NSANTHADDINFOFT_GEN_ALL_CORE
Case D/W Dr Basil Thomas and decided do the EGD, possible stent under GA and intubation with surgical standby.  Plan D/W pt, she understood and agreed with the plan, all questions answered.

## 2022-06-17 NOTE — CONSULT NOTE ADULT - ASSESSMENT
ASSESSMENT  46 yo F with PMH gastric sleeve 5/18/22 at [facility] with Dr Clarke Mandel who presents with abd pain for a week . The patient was transferred from [facility] after CT scan showed fluid collection suspicious for contained leak .COVID + , tested positive in May    IMPRESSION  #Sepsis ruled out on admission   #Leak vs rule out abscess    WBC 4  < from: CT Abdomen and Pelvis w/ Oral Cont and w/ IV Cont (06.13.22 @ 17:09) >  Prior sleeve gastrectomy with irregularity of the suture line and   adjacent fluid collection measuring up to 6.2 cm. Findings suspicious for   contained leak and/or abscess formation.  #Asymptomatic bacteriuria    UCX   10,000 - 49,000 CFU/mL Proteus mirabilis  #COVID-19 PCR: Detected (06-13-22 @ 14:30), tested positive in 5/2022    no symptoms  #Morbid Obesity BMI (kg/m2): 35.5    Creatinine, Serum: 0.6 (06-17-22 @ 06:07)    Weight (kg): 99.8 (06-17-22 @ 15:44)    RECOMMENDATIONS    - Pending GI procedure, suspect leak. If no evidence of abscess- d/c antibiotics   cefTRIAXone   IVPB 1000 milliGRAM(s) IV Intermittent every 24 hours   metroNIDAZOLE  IVPB 500 milliGRAM(s) IV Intermittent every 8 hours    If any questions, please call or send a message on [employer] Teams  Please continue to update ID with any pertinent new laboratory or radiographic findings  Spectra [id number]

## 2022-06-17 NOTE — CHART NOTE - NSCHARTNOTEFT_GEN_A_CORE
PACU ANESTHESIA ADMISSION NOTE      Procedure: EGD  Post op diagnosis:  Gastric sleeve anastomosis    ____  Intubated  TV:______       Rate: ______      FiO2: ______    _x___  Patent Airway    _x___  Full return of protective reflexes    _x___  Full recovery from anesthesia / back to baseline status    Vitals:  T(C): 98.1  HR: 87  BP: 152/86  RR: 18  SpO2: 99%    Mental Status:  _x___ Awake   _____ Alert   _____ Drowsy   _____ Sedated    Nausea/Vomiting:  _x___  NO       ______Yes,   See Post - Op Orders         Pain Scale (0-10):  __0___    Treatment: _x___ None    ____ See Post - Op/PCA Orders    Post - Operative Fluids:   __x__ Oral   ____ See Post - Op Orders    Plan: Discharge:   _x___Home       _____Floor     _____Critical Care    _____  Other:_________________    Comments:  No anesthesia issues or complications noted.  Discharge when criteria met.

## 2022-06-17 NOTE — CONSULT NOTE ADULT - ASSESSMENT
A/P:   47y F s/p sleeve gastrectomy complicated by a leak (5/18 Dr Mandel [facility]) transferred to [facility] for endoscopic mgmt of her leak  -D/w Dr Alfaro, will follow, have reached out to Dr Mandel  -Appreciate GI, will follow up intervention today  -Continue abx and IVF  -Continue protonix and dvt ppx    Christa Azevedo MD  [facility] Fellow    [place] TEAM SPECTRA [phone]

## 2022-06-17 NOTE — PRE-ANESTHESIA EVALUATION ADULT - NSANTHPMHFT_GEN_ALL_CORE
Chart reviewed, pt interviewed and examined, Labs seen. Chart reviewed, pt interviewed and examined, Labs, EKG seen.  PMH: Obesity, S/P Gastric Sleeve last month ? possible concealed leak, HTN, COVID positive, no respiratory symptoms. Chest XRay Clear.

## 2022-06-17 NOTE — PROGRESS NOTE ADULT - SUBJECTIVE AND OBJECTIVE BOX
ZENON MEADOWS 47y Female  MRN#: [id number]   Hospital Day: 1d    SUBJECTIVE  46 yo F with PMH gastric sleeve 5/18/22 at [facility] with Dr Clarke Mandel who presents with abd pain for a week . The patient was transferred from [facility] after CT scan showed fluid collection suspicious for contained leak. The patient originally presented here at [facility] and was transferred to [facility] then transferred back after ct scan findings.    Currently admitted to medicine with the primary diagnosis of abd fluid collection    INTERVAL HPI AND OVERNIGHT EVENTS:  Patient was examined and seen at bedside. This morning she is resting comfortably in bed and reports no issues or overnight events.    REVIEW OF SYMPTOMS:  admits to nausea, bloody nose. intermittent abd pain.     OBJECTIVE  PAST MEDICAL & SURGICAL HISTORY  Obesity    S/P gastric sleeve procedure      ALLERGIES:  No Known Allergies    MEDICATIONS:  STANDING MEDICATIONS  cefTRIAXone   IVPB 1000 milliGRAM(s) IV Intermittent every 24 hours  chlorhexidine 4% Liquid 1 Application(s) Topical <User Schedule>  enoxaparin Injectable 40 milliGRAM(s) SubCutaneous every 24 hours  lactated ringers. 1000 milliLiter(s) IV Continuous <Continuous>  magnesium sulfate  IVPB 2 Gram(s) IV Intermittent every 2 hours  metroNIDAZOLE  IVPB      metroNIDAZOLE  IVPB 500 milliGRAM(s) IV Intermittent every 8 hours  ondansetron Injectable 4 milliGRAM(s) IV Push every 8 hours  pantoprazole  Injectable 40 milliGRAM(s) IV Push daily  potassium chloride  20 mEq/100 mL IVPB 20 milliEquivalent(s) IV Intermittent every 2 hours    PRN MEDICATIONS  acetaminophen     Tablet .. 650 milliGRAM(s) Oral every 6 hours PRN  morphine  - Injectable 2 milliGRAM(s) IV Push every 6 hours PRN      VITAL SIGNS: Last 24 Hours  T(C): 36.4 (17 Jun 2022 05:28), Max: 36.5 (16 Jun 2022 20:08)  T(F): 97.5 (17 Jun 2022 05:28), Max: 97.7 (16 Jun 2022 20:08)  HR: 70 (17 Jun 2022 05:28) (68 - 70)  BP: 145/78 (17 Jun 2022 05:28) (145/78 - 147/83)  BP(mean): 106 (17 Jun 2022 05:28) (106 - 106)  RR: 18 (17 Jun 2022 05:28) (18 - 18)  SpO2: 100% (17 Jun 2022 05:28) (100% - 100%)    LABS:                        9.2    4.37  )-----------( 129      ( 17 Jun 2022 06:07 )             28.5     06-17    145  |  108  |  5<L>  ----------------------------<  98  3.3<L>   |  21  |  0.6<L>    Ca    8.3<L>      17 Jun 2022 06:07  Mg     1.4     06-17    TPro  5.6<L>  /  Alb  2.9<L>  /  TBili  0.3  /  DBili  x   /  AST  15  /  ALT  14  /  AlkPhos  185<H>  06-17    PT/INR - ( 17 Jun 2022 06:07 )   PT: 15.20 sec;   INR: 1.33 ratio        RADIOLOGY:  < from: CT Abdomen and Pelvis w/ Oral Cont and w/ IV Cont (06.13.22 @ 17:09) >  Prior sleeve gastrectomy with irregularity of the suture line and   adjacent fluid collection measuring up to 6.2 cm. Findings suspicious for   contained leak and/or abscess formation.    < end of copied text >    PHYSICAL EXAM:  CONSTITUTIONAL: No acute distress, AAOx3  HEAD: Atraumatic, normocephalic  EYES: sclera clear  ENT: Supple, epitaxis  PULMONARY: Clear to auscultation bilaterally; no wheezes  CARDIOVASCULAR: Regular rate and rhythm; no murmurs  GASTROINTESTINAL: Soft, non-tender, non-distended; bowel sounds present  MUSCULOSKELETAL: no edema  NEUROLOGY: non-focal  SKIN: No rashes or lesions; warm and dry

## 2022-06-17 NOTE — CONSULT NOTE ADULT - SUBJECTIVE AND OBJECTIVE BOX
DORI ZENON  47y, Female  Allergy: No Known Allergies      CHIEF COMPLAINT:   Abdominal pain and emesis (17 Jun 2022 13:25)      LOS  1d    HPI  HPI:  46 yo F with PMH gastric sleeve 5/18/22 at [facility] with Dr Clarke Mandel who presents with abd pain for a week . The patient was transferred from [facility] after CT scan showed fluid collection suspicious for contained leak .the patient originally presented here and was transferred to [facility] then transferred back after ct scan findings.  the pain started 6/10 is Diffuse, non radiating, associated with nausea and vomiting, NBNB ,and constipation, Last BM today.   The patient states that the pain has been intermittent  ,severity 8/10 .with severe nausea and vomiting    Pt denies fevers, chills, chest pain, SOB, back pain, rash, or diarrhea.  The patient reports she tested positive for covid postoperatively ,and again on this admission ,pt is asymptomatic on RA.    The patient is hemodynamically stable ,afebrile  Labs WBC 10k ,Cr 1.2 (unknown baseline)  CT< from: CT Abdomen and Pelvis w/ Oral Cont and w/ IV Cont (06.13.22 @ 17:09) >  Prior sleeve gastrectomy with irregularity of the suture line and   adjacent fluid collection measuring up to 6.2 cm. Findings suspicious for   contained leak and/or abscess formation.         (16 Jun 2022 22:53)      INFECTIOUS DISEASE HISTORY:  ID consulted for possible abd abscess  no fevers  pending EGD    PMH  PAST MEDICAL & SURGICAL HISTORY:  Obesity      S/P gastric sleeve procedure          FAMILY HISTORY  No pertinent family history in first degree relatives        SOCIAL HISTORY  Social History:  Lives with daughter ,non smoker.  Denies alcohol, ilicit drug use  works as school safety agent. (16 Jun 2022 22:53)        ROS  General: Denies rigors, nightsweats  HEENT: Denies headache, rhinorrhea, sore throat, eye pain  CV: Denies CP, palpitations  PULM: Denies wheezing, hemoptysis  GI: as noted above   : Denies discharge, hematuria  MSK: Denies arthralgias, myalgias  SKIN: Denies rash, lesions  NEURO: Denies paresthesias, weakness  PSYCH: Denies depression, anxiety     VITALS:  T(F): 98.1, Max: 98.1 (06-17-22 @ 15:25)  HR: 80  BP: 158/97  RR: 12Vital Signs Last 24 Hrs  T(C): 36.7 (17 Jun 2022 15:44), Max: 36.7 (17 Jun 2022 15:25)  T(F): 98.1 (17 Jun 2022 15:44), Max: 98.1 (17 Jun 2022 15:25)  HR: 80 (17 Jun 2022 15:44) (68 - 80)  BP: 158/97 (17 Jun 2022 15:44) (145/78 - 162/79)  BP(mean): 106 (17 Jun 2022 05:28) (106 - 106)  RR: 12 (17 Jun 2022 15:44) (12 - 18)  SpO2: 100% (17 Jun 2022 15:44) (100% - 100%)    PHYSICAL EXAM:  Gen: NAD, resting in bed  HEENT: Normocephalic, atraumatic  Neck: supple, no lymphadenopathy  CV: Regular rate & regular rhythm  Lungs: decreased BS at bases, no fremitus  Abdomen: Soft, BS present  Ext: Warm, well perfused  Neuro: non focal, awake  Skin: no rash, no erythema  Lines: no phlebitis     TESTS & MEASUREMENTS:                        9.2    4.37  )-----------( 129      ( 17 Jun 2022 06:07 )             28.5     06-17    145  |  108  |  5<L>  ----------------------------<  98  3.3<L>   |  21  |  0.6<L>    Ca    8.3<L>      17 Jun 2022 06:07  Mg     1.4     06-17    TPro  5.6<L>  /  Alb  2.9<L>  /  TBili  0.3  /  DBili  x   /  AST  15  /  ALT  14  /  AlkPhos  185<H>  06-17      LIVER FUNCTIONS - ( 17 Jun 2022 06:07 )  Alb: 2.9 g/dL / Pro: 5.6 g/dL / ALK PHOS: 185 U/L / ALT: 14 U/L / AST: 15 U/L / GGT: x               Culture - Urine (collected 06-13-22 @ 16:15)  Source: Clean Catch Clean Catch (Midstream)  Final Report (06-16-22 @ 12:03):    10,000 - 49,000 CFU/mL Proteus mirabilis    <10,000 CFU/ml Normal Urogenital george present  Organism: Proteus mirabilis (06-16-22 @ 12:03)  Organism: Proteus mirabilis (06-16-22 @ 12:03)      -  Amikacin: S <=16      -  Amoxicillin/Clavulanic Acid: S <=8/4      -  Ampicillin: S <=8 These ampicillin results predict results for amoxicillin      -  Ampicillin/Sulbactam: S <=4/2 Enterobacter, Klebsiella aerogenes, Citrobacter, and Serratia may develop resistance during prolonged therapy (3-4 days)      -  Aztreonam: S <=4      -  Cefazolin: S 16 ([id number]) For uncomplicated UTI with K. pneumoniae, E. coli, or P. mirablis: CYNDI <=16 is sensitive and CYNDI >=32 is resistant. This also predicts results for oral agents cefaclor, cefdinir, cefpodoxime, cefprozil, cefuroxime axetil, cephalexin and locarbef for uncomplicated UTI. Note that some isolates may be susceptible to these agents while testing resistant to cefazolin.      -  Cefepime: S <=2      -  Cefoxitin: S <=8      -  Ceftriaxone: S <=1 Enterobacter, Klebsiella aerogenes, Citrobacter, and Serratia may develop resistance during prolonged therapy      -  Ciprofloxacin: S <=0.25      -  Ertapenem: S <=0.5      -  Gentamicin: S <=2      -  Levofloxacin: S <=0.5      -  Meropenem: S <=1      -  Nitrofurantoin: R >64 Should not be used to treat pyelonephritis      -  Piperacillin/Tazobactam: S <=8      -  Tobramycin: S <=2      -  Trimethoprim/Sulfamethoxazole: S <=0.5/9.5      Method Type: CYNDI        Lactate, Blood: 1.5 mmol/L (06-13-22 @ 13:24)      INFECTIOUS DISEASES TESTING  COVID-19 PCR: Detected (06-13-22 @ 14:30)      INFLAMMATORY MARKERS      RADIOLOGY & ADDITIONAL TESTS:  I have personally reviewed the last Chest xray  CXR      CT      CARDIOLOGY TESTING  12 Lead ECG:   Ventricular Rate 89 BPM    Atrial Rate 89 BPM    P-R Interval 156 ms    QRS Duration 92 ms    Q-T Interval 328 ms    QTC Calculation(Bazett) 399 ms    P Axis 64 degrees    R Axis 85 degrees    T Axis 16 degrees    Diagnosis Line Normal sinus rhythm  Nonspecific T wave abnormality  Abnormal ECG    Confirmed by Hannah Tobin MD ([id number]) on 6/13/2022 6:43:09 PM (06-13-22 @ 13:47)      MEDICATIONS  cefTRIAXone   IVPB 1000 IV Intermittent every 24 hours  chlorhexidine 4% Liquid 1 Topical <User Schedule>  enoxaparin Injectable 40 SubCutaneous every 24 hours  lactated ringers. 1000 IV Continuous <Continuous>  losartan 100 Oral daily  magnesium sulfate  IVPB 2 IV Intermittent every 2 hours  metroNIDAZOLE  IVPB     metroNIDAZOLE  IVPB 500 IV Intermittent every 8 hours  ondansetron Injectable 4 IV Push every 8 hours  pantoprazole  Injectable 40 IV Push daily  Parenteral Nutrition - Adult 1 TPN Continuous <Continuous>  potassium chloride  20 mEq/100 mL IVPB 20 IV Intermittent every 2 hours        ANTIBIOTICS:  cefTRIAXone   IVPB 1000 milliGRAM(s) IV Intermittent every 24 hours  metroNIDAZOLE  IVPB      metroNIDAZOLE  IVPB 500 milliGRAM(s) IV Intermittent every 8 hours      ALLERGIES:  No Known Allergies        
48 yo F with PMH gastric sleeve 5/18/22 at [facility] with Dr Clarke Mandel who presents with abd pain for a week . The patient was transferred from [facility] after CT scan showed fluid collection suspicious for contained leak .the patient originally presented here and was transferred to [facility] then transferred back after ct scan findings. iv access placed at [facility].  the pain started 6/10 is Diffuse, non radiating, associated with nausea and vomiting, NBNB ,and constipation, Last BM today.   The patient states that the pain has been intermittent  ,severity 8/10 .with severe nausea and vomiting    Pt denies fevers, chills, chest pain, SOB, back pain, rash, or diarrhea.  The patient reports she tested positive for covid postoperatively ,and again on this admission ,pt is asymptomatic on RA.    The patient is hemodynamically stable ,afebrile  Labs WBC 10k ,Cr 1.2 (unknown baseline)  CT< from: CT Abdomen and Pelvis w/ Oral Cont and w/ IV Cont (06.13.22 @ 17:09) >  Prior sleeve gastrectomy with irregularity of the suture line and   adjacent fluid collection measuring up to 6.2 cm. Findings suspicious for   contained leak and/or abscess formation.    Vital Signs Last 24 Hrs  T(C): 36.7 (17 Jun 2022 15:44), Max: 36.7 (17 Jun 2022 15:25)  T(F): 98.1 (17 Jun 2022 15:44), Max: 98.1 (17 Jun 2022 15:25)  HR: 80 (17 Jun 2022 15:44) (68 - 80)  BP: 158/97 (17 Jun 2022 15:44) (145/78 - 162/79)  BP(mean): 106 (17 Jun 2022 05:28) (106 - 106)  RR: 12 (17 Jun 2022 15:44) (12 - 18)  SpO2: 100% (17 Jun 2022 15:44) (100% - 100%)  Drug Dosing Weight  Height (cm): 167.6 (17 Jun 2022 15:44)  Weight (kg): 99.8 (17 Jun 2022 15:44)  BMI (kg/m2): 35.5 (17 Jun 2022 15:44)  BSA (m2): 2.08 (17 Jun 2022 15:44)    MEDICATIONS  (STANDING):  cefTRIAXone   IVPB 1000 milliGRAM(s) IV Intermittent every 24 hours  chlorhexidine 4% Liquid 1 Application(s) Topical <User Schedule>  enoxaparin Injectable 40 milliGRAM(s) SubCutaneous every 24 hours  lactated ringers. 1000 milliLiter(s) (100 mL/Hr) IV Continuous <Continuous>  lactated ringers. 1000 milliLiter(s) (60 mL/Hr) IV Continuous <Continuous>  losartan 100 milliGRAM(s) Oral daily  magnesium sulfate  IVPB 2 Gram(s) IV Intermittent every 2 hours  metroNIDAZOLE  IVPB      metroNIDAZOLE  IVPB 500 milliGRAM(s) IV Intermittent every 8 hours  ondansetron Injectable 4 milliGRAM(s) IV Push every 8 hours  pantoprazole  Injectable 40 milliGRAM(s) IV Push daily  Parenteral Nutrition - Adult 1 Each (65 mL/Hr) TPN Continuous <Continuous>  TO START TONIGHT  potassium chloride  20 mEq/100 mL IVPB 20 milliEquivalent(s) IV Intermittent every 2 hours                          9.2    4.37  )-----------( 129      ( 17 Jun 2022 06:07 )             28.5   06-17    145  |  108  |  5<L>  ----------------------------<  98  3.3<L>   |  21  |  0.6<L>    Ca    8.3<L>      17 Jun 2022 06:07  Mg     1.4     06-17    TPro  5.6<L>  /  Alb  2.9<L>  /  TBili  0.3  /  DBili  x   /  AST  15  /  ALT  14  /  AlkPhos  185<H>  06-17  < from: Xray Chest 1 View-PORTABLE IMMEDIATE (Xray Chest 1 View-PORTABLE IMMEDIATE .) (06.17.22 @ 13:58) >  Support devices: PICC line with tip in superior vena cava    Cardiac/mediastinum/hilum: Unremarkable.    Lung parenchyma/Pleura: Within normal limits.    Skeleton/soft tissues: Unremarkable.    < end of copied text >    < from: CT Abdomen and Pelvis w/ Oral Cont and w/ IV Cont (06.13.22 @ 17:09) >  Prior sleeve gastrectomy with irregularity of the suture line and   adjacent fluid collection measuring up to 6.2 cm. Findings suspicious for   contained leak and/or abscess formation.    < end of copied text >    
46 yo F with w/ hx of obesity s/p gastric sleeve on 5/18/22 at [facility] with Dr Clarke Mandel, being evaluated for concern of leak at the anastomotic site.     Pt presented to [facility] on 6/13 with abd pain for a week and had a CT showing the pior sleeve gastrectomy with irregularity of the suture line and adjacent fluid collection measuring up to 6.2 cm. Findings suspicious for contained leak and/or abscess formation.    The patient was evaluated by the bariatric team and then transferred to [facility].   At [facility] pt had a gastrograffin swallow that revealed a leak and the patient was transferred to [facility] for endoscopic management of the leak.     pt reports abd pain that is intermittent, diffuse, non radiating, associated with nausea and vomiting, NBNB ,and constipation, last BM today.   Pt denies fevers, chills, chest pain, SOB, back pain, rash, or diarrhea.   patient tested positive for covid postoperatively ,and again on this admission ,pt is asymptomatic on RA.    The patient is hemodynamically stable ,afebrile.    ROS otherwise negative.    CT< from: CT Abdomen and Pelvis w/ Oral Cont and w/ IV Cont (06.13.22 @ 17:09) >  Prior sleeve gastrectomy with irregularity of the suture line and   adjacent fluid collection measuring up to 6.2 cm. Findings suspicious for   contained leak and/or abscess formation.        PAST MEDICAL & SURGICAL HISTORY:  obesity s/p gastric sleeve      Social History:  Tobacco: neg  Alcohol: neg  Drugs: neg    Home Medications:  hydroCHLOROthiazide 12.5 mg oral capsule: 1 cap(s) orally once a day (16 Jun 2022 23:25)  losartan 100 mg oral tablet: 1 tab(s) orally once a day (16 Jun 2022 23:25)  Vitamin B-12 100 mcg oral tablet: 1 tab(s) orally once a day (16 Jun 2022 23:26)    MEDICATIONS  (STANDING):  cefTRIAXone   IVPB 1000 milliGRAM(s) IV Intermittent every 24 hours  chlorhexidine 4% Liquid 1 Application(s) Topical <User Schedule>  enoxaparin Injectable 40 milliGRAM(s) SubCutaneous every 24 hours  magnesium sulfate  IVPB 2 Gram(s) IV Intermittent every 2 hours  metroNIDAZOLE  IVPB      metroNIDAZOLE  IVPB 500 milliGRAM(s) IV Intermittent every 8 hours  ondansetron Injectable 4 milliGRAM(s) IV Push every 8 hours  pantoprazole  Injectable 40 milliGRAM(s) IV Push daily  potassium chloride  20 mEq/100 mL IVPB 20 milliEquivalent(s) IV Intermittent every 2 hours    MEDICATIONS  (PRN):  acetaminophen     Tablet .. 650 milliGRAM(s) Oral every 6 hours PRN Mild Pain (1 - 3)  morphine  - Injectable 2 milliGRAM(s) IV Push every 6 hours PRN Severe Pain (7 - 10)      Allergies  No Known Allergies      Review of Systems:   Constitutional:  No Fever, No Chills  ENT/Mouth:  No Hearing Changes,  No Difficulty Swallowing  Eyes:  No Eye Pain, No Vision Changes  Cardiovascular:  No Chest Pain, No Palpitations  Respiratory:  No Cough, No Dyspnea  Gastrointestinal:  As described in HPI  Musculoskeletal:  No Joint Swelling, No Back Pain  Skin:  No Skin Lesions, No Jaundice  Neuro:  No Syncope, No Dizziness  Heme/Lymph:  No Bruising, No Bleeding.          Physical Examination:  T(C): 36.4 (06-17-22 @ 05:28), Max: 36.5 (06-16-22 @ 20:08)  HR: 70 (06-17-22 @ 05:28) (68 - 70)  BP: 145/78 (06-17-22 @ 05:28) (145/78 - 147/83)  RR: 18 (06-17-22 @ 05:28) (18 - 18)  SpO2: 100% (06-17-22 @ 05:28) (100% - 100%)        Constitutional: No acute distress.  Eyes:. Conjunctivae are clear, Sclera is non-icteric.  Ears Nose and Throat: The external ears are normal appearing,  Oral mucosa is pink and moist.  Respiratory:  No signs of respiratory distress.   Cardiovascular:  S1 S2, Regular rate and rhythm.  GI: Abdomen is soft, symmetric, and mildly tender in the epigastric and RUQ areas.   Neuro: No Tremor, No involuntary movements  Skin: No rashes, No Jaundice.          Data:                        9.2    4.37  )-----------( 129      ( 17 Jun 2022 06:07 )             28.5     Hgb Trend:  9.2  06-17-22 @ 06:07      06-17    145  |  108  |  5<L>  ----------------------------<  98  3.3<L>   |  21  |  0.6<L>    Ca    8.3<L>      17 Jun 2022 06:07  Mg     1.4     06-17    TPro  5.6<L>  /  Alb  2.9<L>  /  TBili  0.3  /  DBili  x   /  AST  15  /  ALT  14  /  AlkPhos  185<H>  06-17    Liver panel trend:  TBili 0.3   /   AST 15   /   ALT 14   /   AlkP 185   /   Tptn 5.6   /   Alb 2.9    /   DBili --      06-17  TBili 1.2   /   AST 35   /   ALT 24   /   AlkP 206   /   Tptn 7.6   /   Alb 3.9    /   DBili --      06-13      PT/INR - ( 17 Jun 2022 06:07 )   PT: 15.20 sec;   INR: 1.33 ratio        Radiology:  < from: CT Abdomen and Pelvis w/ Oral Cont and w/ IV Cont (06.13.22 @ 17:09) >  Prior sleeve gastrectomy with irregularity of the suture line and   adjacent fluid collection measuring up to 6.2 cm. Findings suspicious for   contained leak and/or abscess formation.    < end of copied text >  
Bariatric Surgery Consult Note       Patient: ZENON MEADOWS , 47y ([birth year])Female   MRN: [id number]  Location: [facility] T83E Neuro 010 A  Visit: 06-16-22 Inpatient  Date: 06-17-22 @ 09:42        Admitted :06-16-22 (1d)  LOS: 1d    Procedure/Dx/Injuries: s/p sleeve gastrectomy on 5/18 at [facility] with Dr Mandel    HPI:   48 yo female with a hx of class 3 obesity s/p sleeve gastrectomy on 5/18 with Dr Mandel at [facility]. She states that after surgery she was never really able to tolerate liquids and always had nausea and cramping pain even with a single bite of food. She also contracted COVID after surgery.   She presented to our ER on 6/13 with worsening abdominal pain and nausea and a CT scan revealed a contained fluid collecting adjacent to the staple line concerning for a leak. The patient was stable and was transferred back to Dr Mandel at [facility] at that time. Apparently she underwent an UGI that showed and active leak. She was then transferred back to [facility] to undergo endoscopic mgmt of this leak with GI. She is currently admitted to medicine, and has re-tested positive for COVID (she states that she had a negative test in the interim.) She is asymptomatic from the COVID but is c/o abdominal pain and nausea. She is currently vomiting and had a nosebleed which has stopped.     PAST MEDICAL & SURGICAL HISTORY:  Hypertension  Obesity  Sleeve gastrectomy 5/18/22    Vitals:   T(F): 97.5 (06-17-22 @ 05:28), Max: 97.7 (06-16-22 @ 20:08)  HR: 70 (06-17-22 @ 05:28)  BP: 145/78 (06-17-22 @ 05:28)  RR: 18 (06-17-22 @ 05:28)  SpO2: 100% (06-17-22 @ 05:28)      Diet, NPO      Fluids: lactated ringers.: Solution, 1000 milliLiter(s) infuse at 60 mL/Hr      I & O's:        PHYSICAL EXAM:  General Appearance: NAD  HEENT: gauze in nares due to nosebleed  Heart:rrr  Lungs: No increased work of breathing or accessory muscle use.   Abdomen:  Soft, min ttp in LUQ but just received pain medication, well healed lap incisions  MSK/Extremities: Warm & well-perfused.   Skin: Warm, dry. No jaundice.       MEDICATIONS  (STANDING):  cefTRIAXone   IVPB 1000 milliGRAM(s) IV Intermittent every 24 hours  chlorhexidine 4% Liquid 1 Application(s) Topical <User Schedule>  enoxaparin Injectable 40 milliGRAM(s) SubCutaneous every 24 hours  lactated ringers. 1000 milliLiter(s) (60 mL/Hr) IV Continuous <Continuous>  magnesium sulfate  IVPB 2 Gram(s) IV Intermittent every 2 hours  metroNIDAZOLE  IVPB      metroNIDAZOLE  IVPB 500 milliGRAM(s) IV Intermittent every 8 hours  ondansetron Injectable 4 milliGRAM(s) IV Push every 8 hours  pantoprazole  Injectable 40 milliGRAM(s) IV Push daily  potassium chloride  20 mEq/100 mL IVPB 20 milliEquivalent(s) IV Intermittent every 2 hours    MEDICATIONS  (PRN):  acetaminophen     Tablet .. 650 milliGRAM(s) Oral every 6 hours PRN Mild Pain (1 - 3)  morphine  - Injectable 2 milliGRAM(s) IV Push every 6 hours PRN Severe Pain (7 - 10)      DVT PROPHYLAXIS: enoxaparin Injectable 40 milliGRAM(s) SubCutaneous every 24 hours    GI PROPHYLAXIS: pantoprazole  Injectable 40 milliGRAM(s) IV Push daily    ANTICOAGULATION:   ANTIBIOTICS:  cefTRIAXone   IVPB 1000 milliGRAM(s)  metroNIDAZOLE  IVPB    metroNIDAZOLE  IVPB 500 milliGRAM(s)            LAB/STUDIES:  Labs:  CAPILLARY BLOOD GLUCOSE                              9.2    4.37  )-----------( 129      ( 17 Jun 2022 06:07 )             28.5       Auto Neutrophil %: 63.4 % (06-17-22 @ 06:07)  Auto Immature Granulocyte %: 0.5 % (06-17-22 @ 06:07)    06-17    145  |  108  |  5<L>  ----------------------------<  98  3.3<L>   |  21  |  0.6<L>      Calcium, Total Serum: 8.3 mg/dL (06-17-22 @ 06:07)      LFTs:             5.6  | 0.3  | 15       ------------------[185     ( 17 Jun 2022 06:07 )  2.9  | x    | 14          Lipase:x      Amylase:x             Coags:     15.20  ----< 1.33    ( 17 Jun 2022 06:07 )     x                       IMAGING: CT as in HPI

## 2022-06-17 NOTE — CONSULT NOTE ADULT - ATTENDING COMMENTS
46yo female COVID+ s/p sleeve gastrectomy 5/18/2022 [facility] complicated by staple line leak transferred to [facility] for endoscopic management. Patient complains of abdominal pain, nausea and vomiting, unsure of fevers. No chest pain or shortness of breath. Labs reviewed - WBC 4, Hb 9.2. Recommend continued NPO, IV fluids, TPN, serial abdominal exams, monitor vital signs and labs for sepsis. F/U GI. Bariatric surgery to follow. COVID precautions.
48 yo F SP lap Gastric sleeve about 3 weeks ago. Patient contracted COVID and ended up with a gastric leak after consuming solids. will plan for EGD with possible closure of the leak and stenting. Continue IV ABx (has a  PICC) and arrange for TPN.

## 2022-06-17 NOTE — CONSULT NOTE ADULT - REASON FOR ADMISSION
Abdominal pain and emesis

## 2022-06-17 NOTE — CONSULT NOTE ADULT - ASSESSMENT
48 yo F with w/ hx of obesity s/p gastric sleeve on 5/18/22 at [facility] with Dr Clarke Mandel, being evaluated for concern of leak at the anastomotic site.     #anastomotic leak s/p gastric sleeve   #fluid collection on CT    keep pt npo for now  EGD today with possible suturing and stent placement  zofran PRN for nausea  encourage ambulation  supportive care per primary team  dvt/gi prophylaxis  further recs to follow the procedure 46 yo F with w/ hx of obesity s/p gastric sleeve on 5/18/22 at [facility] with Dr Clarke Mandel, being evaluated for concern of leak at the anastomotic site.     #anastomotic leak s/p gastric sleeve   #fluid collection on CT    keep pt npo for now  EGD today with possible suturing and stent placement  continue IV antibiotics  zofran PRN for nausea  encourage ambulation  supportive care per primary team  dvt/gi prophylaxis  further recs to follow the procedure 46 yo F with w/ hx of obesity s/p gastric sleeve on 5/18/22 at [facility] with Dr Clarke Mandel, being evaluated for concern of leak at the anastomotic site.     #leak s/p gastric sleeve   #fluid collection on CT    keep pt npo for now  EGD today with possible suturing and stent placement  continue IV antibiotics  zofran PRN for nausea  encourage ambulation  supportive care per primary team  dvt/gi prophylaxis  further recs to follow the procedure

## 2022-06-17 NOTE — PROGRESS NOTE ADULT - ASSESSMENT
46 yo F with PMH gastric sleeve 5/18/22 at [facility] with Dr Clarke Mandel who presents with abd pain for a week . The patient was transferred from [facility] after CT scan showed fluid collection suspicious for contained leak. The patient originally presented here at [facility] and was transferred to [facility] then transferred back after ct scan findings.    Currently admitted to medicine with the primary diagnosis of abd fluid collection      #Diffuse abdominal pain secondary to contained leak and/or abscess  #S/P gastric sleeve surgery 5/18/22 at [facility]  -hemodynamically stable ,afebrile ,wbc 10k  -transferred from [facility] for concern of contained leak  -sp gastric sleeve 5/18/22 at [facility] with Dr Clarke Mandel   -CT 6/13/22 Prior sleeve gastrectomy with irregularity of the suture line and   adjacent fluid collection measuring up to 6.2 cm. Findings suspicious for   contained leak and/or abscess formation.  - c/w Zofran and morphine IV PRN   - c/w Rocephin and flagyl  - keep NPO, c/w IVF  - GI consulted, plan for EGD today with possible suturing and stent placement  - bariatric surgery following  - ID consulted, pending recs  - PICC line placed at [facility], patient may need parenteral nutrition  - Nutrition consulted, f/u recs    #Mild COVID, asymptomatic  - On room air  - continue to monitor    #Epistaxis  - apply pressure, ice  - cont. to monitor  - consider ENT consult if persistent    #HTN   -on home losartan 100mg  - will restart     #HypoMg  #HypoKalemia  - repleted    #DVT px lovenox  #GI px PPI  #Diet NPO  #Code full  #Activity: AAT    #Disposition from home    Pending: EGD findings, ID recs, Nutrition recs

## 2022-06-17 NOTE — CONSULT NOTE ADULT - ASSESSMENT
IMP:  - s/p gastric sleeve 5/18 at [facility]  - contained leak vs abscess   - covid +  - morbid obesity    When initially discussed with residents, there was no height and weight on chart - requested and done, see above.  No report yet from [facility] so CXR requested to verify the iv access as a central one, and to confirm position.  Will start TPN tonight, low carb content to start, and check lytes and phos in am  triglyceride level in am  expect some refeeding syndrome issues - lytes, vits added accordingly

## 2022-06-18 LAB
ALBUMIN SERPL ELPH-MCNC: 2.8 G/DL — LOW (ref 3.5–5.2)
ALP SERPL-CCNC: 163 U/L — HIGH (ref 30–115)
ALT FLD-CCNC: 21 U/L — SIGNIFICANT CHANGE UP (ref 0–41)
ANION GAP SERPL CALC-SCNC: 12 MMOL/L — SIGNIFICANT CHANGE UP (ref 7–14)
AST SERPL-CCNC: 27 U/L — SIGNIFICANT CHANGE UP (ref 0–41)
BASOPHILS # BLD AUTO: 0.01 K/UL — SIGNIFICANT CHANGE UP (ref 0–0.2)
BASOPHILS NFR BLD AUTO: 0.2 % — SIGNIFICANT CHANGE UP (ref 0–1)
BILIRUB SERPL-MCNC: 0.2 MG/DL — SIGNIFICANT CHANGE UP (ref 0.2–1.2)
BUN SERPL-MCNC: 6 MG/DL — LOW (ref 10–20)
CALCIUM SERPL-MCNC: 8.4 MG/DL — LOW (ref 8.5–10.1)
CHLORIDE SERPL-SCNC: 104 MMOL/L — SIGNIFICANT CHANGE UP (ref 98–110)
CO2 SERPL-SCNC: 23 MMOL/L — SIGNIFICANT CHANGE UP (ref 17–32)
CREAT SERPL-MCNC: 0.7 MG/DL — SIGNIFICANT CHANGE UP (ref 0.7–1.5)
EGFR: 108 ML/MIN/1.73M2 — SIGNIFICANT CHANGE UP
EOSINOPHIL # BLD AUTO: 0 K/UL — SIGNIFICANT CHANGE UP (ref 0–0.7)
EOSINOPHIL NFR BLD AUTO: 0 % — SIGNIFICANT CHANGE UP (ref 0–8)
GLUCOSE SERPL-MCNC: 164 MG/DL — HIGH (ref 70–99)
HCT VFR BLD CALC: 27.3 % — LOW (ref 37–47)
HGB BLD-MCNC: 9.1 G/DL — LOW (ref 12–16)
IMM GRANULOCYTES NFR BLD AUTO: 1.4 % — HIGH (ref 0.1–0.3)
LYMPHOCYTES # BLD AUTO: 0.91 K/UL — LOW (ref 1.2–3.4)
LYMPHOCYTES # BLD AUTO: 18.6 % — LOW (ref 20.5–51.1)
MAGNESIUM SERPL-MCNC: 2.5 MG/DL — HIGH (ref 1.8–2.4)
MCHC RBC-ENTMCNC: 29 PG — SIGNIFICANT CHANGE UP (ref 27–31)
MCHC RBC-ENTMCNC: 33.3 G/DL — SIGNIFICANT CHANGE UP (ref 32–37)
MCV RBC AUTO: 86.9 FL — SIGNIFICANT CHANGE UP (ref 81–99)
MONOCYTES # BLD AUTO: 0.42 K/UL — SIGNIFICANT CHANGE UP (ref 0.1–0.6)
MONOCYTES NFR BLD AUTO: 8.6 % — SIGNIFICANT CHANGE UP (ref 1.7–9.3)
NEUTROPHILS # BLD AUTO: 3.49 K/UL — SIGNIFICANT CHANGE UP (ref 1.4–6.5)
NEUTROPHILS NFR BLD AUTO: 71.2 % — SIGNIFICANT CHANGE UP (ref 42.2–75.2)
NRBC # BLD: 0 /100 WBCS — SIGNIFICANT CHANGE UP (ref 0–0)
PHOSPHATE SERPL-MCNC: 3.1 MG/DL — SIGNIFICANT CHANGE UP (ref 2.1–4.9)
PLATELET # BLD AUTO: 129 K/UL — LOW (ref 130–400)
POTASSIUM SERPL-MCNC: 3.6 MMOL/L — SIGNIFICANT CHANGE UP (ref 3.5–5)
POTASSIUM SERPL-SCNC: 3.6 MMOL/L — SIGNIFICANT CHANGE UP (ref 3.5–5)
PROT SERPL-MCNC: 5.7 G/DL — LOW (ref 6–8)
RBC # BLD: 3.14 M/UL — LOW (ref 4.2–5.4)
RBC # FLD: 13.9 % — SIGNIFICANT CHANGE UP (ref 11.5–14.5)
SODIUM SERPL-SCNC: 139 MMOL/L — SIGNIFICANT CHANGE UP (ref 135–146)
TRIGL SERPL-MCNC: 114 MG/DL — SIGNIFICANT CHANGE UP
WBC # BLD: 4.9 K/UL — SIGNIFICANT CHANGE UP (ref 4.8–10.8)
WBC # FLD AUTO: 4.9 K/UL — SIGNIFICANT CHANGE UP (ref 4.8–10.8)

## 2022-06-18 PROCEDURE — 99233 SBSQ HOSP IP/OBS HIGH 50: CPT

## 2022-06-18 PROCEDURE — 99232 SBSQ HOSP IP/OBS MODERATE 35: CPT

## 2022-06-18 RX ORDER — HYDRALAZINE HCL 50 MG
5 TABLET ORAL ONCE
Refills: 0 | Status: COMPLETED | OUTPATIENT
Start: 2022-06-18 | End: 2022-06-18

## 2022-06-18 RX ORDER — ELECTROLYTE SOLUTION,INJ
1 VIAL (ML) INTRAVENOUS
Refills: 0 | Status: DISCONTINUED | OUTPATIENT
Start: 2022-06-18 | End: 2022-06-18

## 2022-06-18 RX ORDER — LOSARTAN POTASSIUM 100 MG/1
100 TABLET, FILM COATED ORAL ONCE
Refills: 0 | Status: COMPLETED | OUTPATIENT
Start: 2022-06-18 | End: 2022-06-18

## 2022-06-18 RX ORDER — I.V. FAT EMULSION 20 G/100ML
1 EMULSION INTRAVENOUS
Qty: 100 | Refills: 0 | Status: DISCONTINUED | OUTPATIENT
Start: 2022-06-18 | End: 2022-06-18

## 2022-06-18 RX ADMIN — Medication 100 MILLIGRAM(S): at 06:04

## 2022-06-18 RX ADMIN — ENOXAPARIN SODIUM 40 MILLIGRAM(S): 100 INJECTION SUBCUTANEOUS at 22:45

## 2022-06-18 RX ADMIN — Medication 100 MILLIGRAM(S): at 14:27

## 2022-06-18 RX ADMIN — LOSARTAN POTASSIUM 100 MILLIGRAM(S): 100 TABLET, FILM COATED ORAL at 17:20

## 2022-06-18 RX ADMIN — ONDANSETRON 4 MILLIGRAM(S): 8 TABLET, FILM COATED ORAL at 05:01

## 2022-06-18 RX ADMIN — ONDANSETRON 4 MILLIGRAM(S): 8 TABLET, FILM COATED ORAL at 22:45

## 2022-06-18 RX ADMIN — Medication 1 EACH: at 21:09

## 2022-06-18 RX ADMIN — PANTOPRAZOLE SODIUM 40 MILLIGRAM(S): 20 TABLET, DELAYED RELEASE ORAL at 05:01

## 2022-06-18 RX ADMIN — CHLORHEXIDINE GLUCONATE 1 APPLICATION(S): 213 SOLUTION TOPICAL at 05:02

## 2022-06-18 RX ADMIN — ENOXAPARIN SODIUM 40 MILLIGRAM(S): 100 INJECTION SUBCUTANEOUS at 00:10

## 2022-06-18 RX ADMIN — PANTOPRAZOLE SODIUM 40 MILLIGRAM(S): 20 TABLET, DELAYED RELEASE ORAL at 17:41

## 2022-06-18 RX ADMIN — CEFTRIAXONE 100 MILLIGRAM(S): 500 INJECTION, POWDER, FOR SOLUTION INTRAMUSCULAR; INTRAVENOUS at 06:04

## 2022-06-18 RX ADMIN — Medication 100 MILLIGRAM(S): at 21:13

## 2022-06-18 RX ADMIN — Medication 5 MILLIGRAM(S): at 06:01

## 2022-06-18 RX ADMIN — ONDANSETRON 4 MILLIGRAM(S): 8 TABLET, FILM COATED ORAL at 14:26

## 2022-06-18 RX ADMIN — I.V. FAT EMULSION 41.7 GM/KG/DAY: 20 EMULSION INTRAVENOUS at 21:09

## 2022-06-18 NOTE — PROGRESS NOTE ADULT - ASSESSMENT
46 yo F PMHx of recent gastric sleeve (5/18/22) performed at [facility] with Dr Clarke Mandel who presents for evaluation of abdominal pain for 1 week. Pt initially presented to [facility] on 6/13 for evaluation of pain, was found to have a gastric sleeve leak and was transferred to [facility] for evaluation by her bariatric surgeon. On 6/16 she was transferred back to [facility] for endoscopy and suturing of leak. Pt underwent endoscopy yesterday, awaiting report. Pt requires time for gut to heal so TPN was initiated on 6/17.        Diffuse abdominal pain secondary to contained leak and/or abscess from gastric sleeve  - awaiting GI follow up/EGD report. If no abscess then dc abx as per ID. In interim c/w rocephin, flagyl  - monitor for fever/leukocytosis  - NPO status, c/w TPN  - resume PO diet when ok with GI, will need tapering off TPN when ok to eat      Asymptomatic COVID  - stable    HTN   -on home losartan 100mg  - c/w losartan    Nutritional deficiency  Hypokalemia  Hypomagnesmia  - replenished    Normocytic anemia  - likely due to gastric sleeve (iron deficiency  - hgb 11.5 on admission, now 9.1--suspect pt was hemoconcentrated initially and hgb of 9 is more accurate    #DVT px lovenox  #GI px PPI  #Diet: parenteral nutrition for now  #Code full  #Activity: AAT    #Progress Note Handoff:  Pending (specify):  GI follow up, [facility] records  Family discussion: spoke with pt regarding TPN   Disposition: Home__x_/SNF___/Other________/Unknown at this time________ likely dc around 6/21

## 2022-06-18 NOTE — PROGRESS NOTE ADULT - SUBJECTIVE AND OBJECTIVE BOX
ZENON MEADOWS 47y Female  MRN#: [id number]   Hospital Day: 2d    SUBJECTIVE  46 yo F with PMH gastric sleeve 5/18/22 at [facility] with Dr Clarke Mandel who presents with abd pain for a week . The patient was transferred from [facility] after CT scan showed fluid collection suspicious for contained leak. The patient originally presented here at [facility] and was transferred to [facility] then transferred back after ct scan findings.    Currently admitted to medicine with the primary diagnosis of abd fluid collection      INTERVAL HPI AND OVERNIGHT EVENTS:  Patient was examined and seen at bedside. This morning she is resting comfortably in bed and reports no issues or overnight events.    REVIEW OF SYMPTOMS:      OBJECTIVE  PAST MEDICAL & SURGICAL HISTORY  Obesity    S/P gastric sleeve procedure      ALLERGIES:  No Known Allergies    MEDICATIONS:  STANDING MEDICATIONS  cefTRIAXone   IVPB 1000 milliGRAM(s) IV Intermittent every 24 hours  chlorhexidine 4% Liquid 1 Application(s) Topical <User Schedule>  enoxaparin Injectable 40 milliGRAM(s) SubCutaneous every 24 hours  lactated ringers. 1000 milliLiter(s) IV Continuous <Continuous>  lactated ringers. 1000 milliLiter(s) IV Continuous <Continuous>  losartan 100 milliGRAM(s) Oral daily  metroNIDAZOLE  IVPB      metroNIDAZOLE  IVPB 500 milliGRAM(s) IV Intermittent every 8 hours  ondansetron Injectable 4 milliGRAM(s) IV Push every 8 hours  pantoprazole  Injectable 40 milliGRAM(s) IV Push every 12 hours  Parenteral Nutrition - Adult 1 Each TPN Continuous <Continuous>    PRN MEDICATIONS  acetaminophen     Tablet .. 650 milliGRAM(s) Oral every 6 hours PRN  HYDROmorphone  Injectable 1 milliGRAM(s) IV Push every 10 minutes PRN  HYDROmorphone  Injectable 0.5 milliGRAM(s) IV Push every 10 minutes PRN  morphine  - Injectable 2 milliGRAM(s) IV Push every 6 hours PRN  ondansetron Injectable 4 milliGRAM(s) IV Push once PRN      VITAL SIGNS: Last 24 Hours  T(C): 36.2 (18 Jun 2022 05:10), Max: 36.7 (17 Jun 2022 15:25)  T(F): 97.1 (18 Jun 2022 05:10), Max: 98.1 (17 Jun 2022 15:25)  HR: 63 (18 Jun 2022 05:10) (63 - 86)  BP: 170/79 (18 Jun 2022 05:10) (135/60 - 170/79)  BP(mean): --  RR: 18 (18 Jun 2022 05:10) (12 - 21)  SpO2: 80% (17 Jun 2022 17:24) (80% - 100%)    LABS:                        9.1    4.90  )-----------( 129      ( 18 Jun 2022 06:53 )             27.3     06-17    145  |  108  |  5<L>  ----------------------------<  98  3.3<L>   |  21  |  0.6<L>    Ca    8.3<L>      17 Jun 2022 06:07  Mg     1.4     06-17    TPro  5.6<L>  /  Alb  2.9<L>  /  TBili  0.3  /  DBili  x   /  AST  15  /  ALT  14  /  AlkPhos  185<H>  06-17    PT/INR - ( 17 Jun 2022 06:07 )   PT: 15.20 sec;   INR: 1.33 ratio        RADIOLOGY:      PHYSICAL EXAM:       ZENON MEADOWS 47y Female  MRN#: [id number]   Hospital Day: 2d    SUBJECTIVE  48 yo F with PMH gastric sleeve 5/18/22 at [facility] with Dr Clarke Mandel who presents with abd pain for a week . The patient was transferred from [facility] after CT scan showed fluid collection suspicious for contained leak. The patient originally presented here at [facility] and was transferred to [facility] then transferred back after ct scan findings.    Currently admitted to medicine with the primary diagnosis of abd fluid collection      INTERVAL HPI AND OVERNIGHT EVENTS:  Patient was examined and seen at bedside. This morning she is resting comfortably in bed and reports no issues or overnight events.    REVIEW OF SYMPTOMS:  Denies any abd pain, nausea, vomiting this am. No Fever or sweats. Admits to loose stools and overall feels a lot better.    OBJECTIVE  PAST MEDICAL & SURGICAL HISTORY  Obesity    S/P gastric sleeve procedure      ALLERGIES:  No Known Allergies    MEDICATIONS:  STANDING MEDICATIONS  cefTRIAXone   IVPB 1000 milliGRAM(s) IV Intermittent every 24 hours  chlorhexidine 4% Liquid 1 Application(s) Topical <User Schedule>  enoxaparin Injectable 40 milliGRAM(s) SubCutaneous every 24 hours  lactated ringers. 1000 milliLiter(s) IV Continuous <Continuous>  lactated ringers. 1000 milliLiter(s) IV Continuous <Continuous>  losartan 100 milliGRAM(s) Oral daily  metroNIDAZOLE  IVPB      metroNIDAZOLE  IVPB 500 milliGRAM(s) IV Intermittent every 8 hours  ondansetron Injectable 4 milliGRAM(s) IV Push every 8 hours  pantoprazole  Injectable 40 milliGRAM(s) IV Push every 12 hours  Parenteral Nutrition - Adult 1 Each TPN Continuous <Continuous>    PRN MEDICATIONS  acetaminophen     Tablet .. 650 milliGRAM(s) Oral every 6 hours PRN  HYDROmorphone  Injectable 1 milliGRAM(s) IV Push every 10 minutes PRN  HYDROmorphone  Injectable 0.5 milliGRAM(s) IV Push every 10 minutes PRN  morphine  - Injectable 2 milliGRAM(s) IV Push every 6 hours PRN  ondansetron Injectable 4 milliGRAM(s) IV Push once PRN      VITAL SIGNS: Last 24 Hours  T(C): 36.2 (18 Jun 2022 05:10), Max: 36.7 (17 Jun 2022 15:25)  T(F): 97.1 (18 Jun 2022 05:10), Max: 98.1 (17 Jun 2022 15:25)  HR: 63 (18 Jun 2022 05:10) (63 - 86)  BP: 170/79 (18 Jun 2022 05:10) (135/60 - 170/79)  BP(mean): --  RR: 18 (18 Jun 2022 05:10) (12 - 21)  SpO2: 80% (17 Jun 2022 17:24) (80% - 100%)    LABS:                        9.1    4.90  )-----------( 129      ( 18 Jun 2022 06:53 )             27.3     06-17    145  |  108  |  5<L>  ----------------------------<  98  3.3<L>   |  21  |  0.6<L>    Ca    8.3<L>      17 Jun 2022 06:07  Mg     1.4     06-17    TPro  5.6<L>  /  Alb  2.9<L>  /  TBili  0.3  /  DBili  x   /  AST  15  /  ALT  14  /  AlkPhos  185<H>  06-17    PT/INR - ( 17 Jun 2022 06:07 )   PT: 15.20 sec;   INR: 1.33 ratio        RADIOLOGY:      PHYSICAL EXAM:  CONSTITUTIONAL: No acute distress, AAOx3  HEAD: Atraumatic, normocephalic  EYES: sclera clear  ENT: Supple  PULMONARY: Clear to auscultation bilaterally; no wheezes  CARDIOVASCULAR: Regular rate and rhythm; no murmurs  GASTROINTESTINAL: Soft, non-tender, non-distended; bowel sounds present  MUSCULOSKELETAL: no edema  NEUROLOGY: non-focal  SKIN: No rashes or lesions; warm and dry

## 2022-06-18 NOTE — PROGRESS NOTE ADULT - ASSESSMENT
A/P:   47y F s/p sleeve gastrectomy complicated by a leak (5/18 Dr Tequila GLEZ) transferred to [facility] for endoscopic mgmt of her leak  -f/u egd note  -npo  -ivf  -trend cbc    Christa Azevedo MD  MIS Fellow    BLUE TEAM SPECTRA [id number] ASSESSMENT:  47y F s/p sleeve gastrectomy complicated by a leak (5/18 Dr Tequila GLEZ) transferred to [facility] for endoscopic mgmt of her leak    PLAN:  -f/u egd note  -npo  -ivf and TPN per primary team  -trend cbc  -trend vitals    BLUE TEAM SPECTRA [id number]

## 2022-06-18 NOTE — PROGRESS NOTE ADULT - ASSESSMENT
46 yo F with PMH gastric sleeve 5/18/22 at [facility] with Dr Clarke Mandel who presents with abd pain for a week . The patient was transferred from [facility] after CT scan showed fluid collection suspicious for contained leak. The patient originally presented here at [facility] and was transferred to [facility] then transferred back after ct scan findings.    Currently admitted to medicine with the primary diagnosis of abd fluid collection      #Diffuse abdominal pain secondary to contained leak and/or abscess  #S/P gastric sleeve surgery 5/18/22 at [facility]  -hemodynamically stable ,afebrile ,wbc 10k  -transferred from [facility] for concern of contained leak  -sp gastric sleeve 5/18/22 at [facility] with Dr Clarke Mandel   -CT 6/13/22 Prior sleeve gastrectomy with irregularity of the suture line and   adjacent fluid collection measuring up to 6.2 cm. Findings suspicious for   contained leak and/or abscess formation.  - c/w Zofran and morphine IV PRN   - c/w Rocephin and flagyl  - keep NPO, c/w IVF  - GI consulted, s/p EGD 6/19 -   - bariatric surgery following  - ID consulted, recs appreciated  - PICC line placed at [facility]  - Nutrition consulted, recs appreciated - c/w parenteral nutrition via PICC      #Mild COVID, asymptomatic  - On room air  - continue to monitor    #Epistaxis  - apply pressure, ice  - cont. to monitor  - consider ENT consult if persistent    #HTN   -on home losartan 100mg  - will restart     #HypoMg  #HypoKalemia  - repleted    #DVT px lovenox  #GI px PPI  #Diet NPO  #Code full  #Activity: AAT    #Disposition from home    Pending: clinical improvement 46 yo F with PMH gastric sleeve 5/18/22 at [facility] with Dr Clarke Mandel who presents with abd pain for a week . The patient was transferred from [facility] after CT scan showed fluid collection suspicious for contained leak. The patient originally presented here at [facility] and was transferred to [facility] then transferred back after ct scan findings.    Currently admitted to medicine with the primary diagnosis of abd fluid collection      #Diffuse abdominal pain secondary to contained leak and/or abscess  #S/P gastric sleeve surgery 5/18/22 at [facility]  -hemodynamically stable ,afebrile ,wbc 10k  -transferred from [facility] for concern of contained leak  -sp gastric sleeve 5/18/22 at [facility] with Dr Clarke Mandel   -CT 6/13/22 Prior sleeve gastrectomy with irregularity of the suture line and   adjacent fluid collection measuring up to 6.2 cm. Findings suspicious for   contained leak and/or abscess formation.  - c/w Zofran and morphine IV PRN   - c/w Rocephin and flagyl for now, will dc if no evidence obscess on EGD  - GI consulted, s/p EGD 6/19 -patient reports stents being placed? no official EGD report yet   - bariatric surgery following  - ID consulted, recs appreciated   - PICC line placed at [facility]  - Nutrition consulted, recs appreciated - c/w parenteral nutrition via PICC  - d/c IVF for now      #Mild COVID, asymptomatic  - On room air  - continue to monitor    #Epistaxis - resolved  - cont. to monitor    #HTN   -on home losartan 100mg  - c/w losartan      #DVT px lovenox  #GI px PPI  #Diet: parenteral nutrition for now  #Code full  #Activity: AAT    #Disposition from home    Pending: clinical improvement

## 2022-06-18 NOTE — PROGRESS NOTE ADULT - ATTENDING COMMENTS
46yo female COVID+ s/p sleeve gastrectomy 5/18/2022 [facility] complicated by staple line leak s/p endoscopic clip placement 6/17. At this time, patient states she feels much better than prior to the procedure. Abdominal pain, nausea and vomiting have resolved. Patient denies fever/chills, chest pain or shortness of breath. On exam, patient has minimal tenderness in the upper abdomen without peritonitis. Labs reviewed - WBC 4, Hb 9.1. Recommend continued NPO, IV fluids, TPN, serial abdominal exams, monitor vital signs and labs for sepsis. F/U GI. Bariatric surgery to follow.

## 2022-06-18 NOTE — PROGRESS NOTE ADULT - SUBJECTIVE AND OBJECTIVE BOX
GENERAL SURGERY PROGRESS NOTE    Patient: ZENON MEADOWS , 47y ([birth year])Female   MRN: [id number]  Location: [address] Neuro 010 A  Visit: 06-16-22 Inpatient  Date: 06-18-22 @ 03:56    Hospital Day #: 3  Post-Op Day #:    Procedure/Dx/Injuries: 6.2 cm fluid collection adjacent to sleeve gastrectomy staple line    Events of past 24 hours: had EGD with GI with stent placement, f/u procedure note. Patien without pain, no nausea/vomiting, no tenderness    PAST MEDICAL & SURGICAL HISTORY:  Obesity      S/P gastric sleeve procedure          Vitals:   T(F): 98 (06-17-22 @ 20:01), Max: 98.1 (06-17-22 @ 15:25)  HR: 80 (06-17-22 @ 20:01)  BP: 135/60 (06-17-22 @ 20:01)  RR: 18 (06-17-22 @ 20:01)  SpO2: 80% (06-17-22 @ 17:24)      Diet, NPO      Fluids: lactated ringers.: Solution, 1000 milliLiter(s) infuse at 100 mL/Hr  Provider's Contact #: [phone]  lactated ringers.: Solution, 1000 milliLiter(s) infuse at 60 mL/Hr      I & O's:      Bowel Movement: : [] YES [] NO  Flatus: : [] YES [] NO    PHYSICAL EXAM:  General: Sad affect  HEENT: NCAT, YOLANDA, EOMI, Trachea ML, Neck supple  Cardiac: RRR S1, S2, no Murmurs, rubs or gallops  Respiratory: CTAB, normal respiratory effort, breath sounds equal BL, no wheeze, rhonchi or crackles  Abdomen: Soft, non-distended, non-tender    MEDICATIONS  (STANDING):  cefTRIAXone   IVPB 1000 milliGRAM(s) IV Intermittent every 24 hours  chlorhexidine 4% Liquid 1 Application(s) Topical <User Schedule>  enoxaparin Injectable 40 milliGRAM(s) SubCutaneous every 24 hours  lactated ringers. 1000 milliLiter(s) (100 mL/Hr) IV Continuous <Continuous>  lactated ringers. 1000 milliLiter(s) (60 mL/Hr) IV Continuous <Continuous>  losartan 100 milliGRAM(s) Oral daily  metroNIDAZOLE  IVPB      metroNIDAZOLE  IVPB 500 milliGRAM(s) IV Intermittent every 8 hours  ondansetron Injectable 4 milliGRAM(s) IV Push every 8 hours  pantoprazole  Injectable 40 milliGRAM(s) IV Push every 12 hours  Parenteral Nutrition - Adult 1 Each (65 mL/Hr) TPN Continuous <Continuous>    MEDICATIONS  (PRN):  acetaminophen     Tablet .. 650 milliGRAM(s) Oral every 6 hours PRN Mild Pain (1 - 3)  HYDROmorphone  Injectable 1 milliGRAM(s) IV Push every 10 minutes PRN Severe Pain (7 - 10)  HYDROmorphone  Injectable 0.5 milliGRAM(s) IV Push every 10 minutes PRN Moderate Pain (4 - 6)  morphine  - Injectable 2 milliGRAM(s) IV Push every 6 hours PRN Severe Pain (7 - 10)  ondansetron Injectable 4 milliGRAM(s) IV Push once PRN Nausea and/or Vomiting      DVT PROPHYLAXIS: enoxaparin Injectable 40 milliGRAM(s) SubCutaneous every 24 hours    GI PROPHYLAXIS: pantoprazole  Injectable 40 milliGRAM(s) IV Push every 12 hours    ANTICOAGULATION:   ANTIBIOTICS:  cefTRIAXone   IVPB 1000 milliGRAM(s)  metroNIDAZOLE  IVPB    metroNIDAZOLE  IVPB 500 milliGRAM(s)            LAB/STUDIES:  Labs:  CAPILLARY BLOOD GLUCOSE                              9.2    4.37  )-----------( 129      ( 17 Jun 2022 06:07 )             28.5       Auto Neutrophil %: 63.4 % (06-17-22 @ 06:07)  Auto Immature Granulocyte %: 0.5 % (06-17-22 @ 06:07)    06-17    145  |  108  |  5<L>  ----------------------------<  98  3.3<L>   |  21  |  0.6<L>      Calcium, Total Serum: 8.3 mg/dL (06-17-22 @ 06:07)      LFTs:             5.6  | 0.3  | 15       ------------------[185     ( 17 Jun 2022 06:07 )  2.9  | x    | 14          Lipase:x      Amylase:x             Coags:     15.20  ----< 1.33    ( 17 Jun 2022 06:07 )     x                                GENERAL SURGERY PROGRESS NOTE    Patient: ZENON MEADOWS , 47y ([birth year])Female   MRN: [id number]  Location: [address] Neuro 010 A  Visit: 06-16-22 Inpatient  Date: 06-18-22 @ 03:56    Hospital Day #: 3  Post-Op Day #:    Procedure/Dx/Injuries: 47y F s/p sleeve gastrectomy complicated by a leak (5/18 Dr Tequila GLEZ) transferred to [facility] for endoscopic mgmt.    Events of past 24 hours: had EGD with GI with stent placement, f/u procedure note. Patien without pain, no nausea/vomiting, no tenderness    PAST MEDICAL & SURGICAL HISTORY:  Obesity      S/P gastric sleeve procedure          Vitals:   T(F): 98 (06-17-22 @ 20:01), Max: 98.1 (06-17-22 @ 15:25)  HR: 80 (06-17-22 @ 20:01)  BP: 135/60 (06-17-22 @ 20:01)  RR: 18 (06-17-22 @ 20:01)  SpO2: 80% (06-17-22 @ 17:24)      Diet, NPO      Fluids: lactated ringers.: Solution, 1000 milliLiter(s) infuse at 100 mL/Hr  Provider's Contact #: [phone]  lactated ringers.: Solution, 1000 milliLiter(s) infuse at 60 mL/Hr      I & O's:      Bowel Movement: : [x] YES [] NO  Flatus: : [x] YES [] NO    PHYSICAL EXAM:  General: Sad affect  HEENT: NCAT, YOLANDA, EOMI, Trachea ML, Neck supple  Cardiac: RRR S1, S2, no Murmurs, rubs or gallops  Respiratory: CTAB, normal respiratory effort, breath sounds equal BL, no wheeze, rhonchi or crackles  Abdomen: Soft, non-distended, non-tender    MEDICATIONS  (STANDING):  cefTRIAXone   IVPB 1000 milliGRAM(s) IV Intermittent every 24 hours  chlorhexidine 4% Liquid 1 Application(s) Topical <User Schedule>  enoxaparin Injectable 40 milliGRAM(s) SubCutaneous every 24 hours  lactated ringers. 1000 milliLiter(s) (100 mL/Hr) IV Continuous <Continuous>  lactated ringers. 1000 milliLiter(s) (60 mL/Hr) IV Continuous <Continuous>  losartan 100 milliGRAM(s) Oral daily  metroNIDAZOLE  IVPB      metroNIDAZOLE  IVPB 500 milliGRAM(s) IV Intermittent every 8 hours  ondansetron Injectable 4 milliGRAM(s) IV Push every 8 hours  pantoprazole  Injectable 40 milliGRAM(s) IV Push every 12 hours  Parenteral Nutrition - Adult 1 Each (65 mL/Hr) TPN Continuous <Continuous>    MEDICATIONS  (PRN):  acetaminophen     Tablet .. 650 milliGRAM(s) Oral every 6 hours PRN Mild Pain (1 - 3)  HYDROmorphone  Injectable 1 milliGRAM(s) IV Push every 10 minutes PRN Severe Pain (7 - 10)  HYDROmorphone  Injectable 0.5 milliGRAM(s) IV Push every 10 minutes PRN Moderate Pain (4 - 6)  morphine  - Injectable 2 milliGRAM(s) IV Push every 6 hours PRN Severe Pain (7 - 10)  ondansetron Injectable 4 milliGRAM(s) IV Push once PRN Nausea and/or Vomiting      DVT PROPHYLAXIS: enoxaparin Injectable 40 milliGRAM(s) SubCutaneous every 24 hours    GI PROPHYLAXIS: pantoprazole  Injectable 40 milliGRAM(s) IV Push every 12 hours    ANTICOAGULATION:   ANTIBIOTICS:  cefTRIAXone   IVPB 1000 milliGRAM(s)  metroNIDAZOLE  IVPB    metroNIDAZOLE  IVPB 500 milliGRAM(s)            LAB/STUDIES:  Labs:  CAPILLARY BLOOD GLUCOSE                              9.2    4.37  )-----------( 129      ( 17 Jun 2022 06:07 )             28.5       Auto Neutrophil %: 63.4 % (06-17-22 @ 06:07)  Auto Immature Granulocyte %: 0.5 % (06-17-22 @ 06:07)    06-17    145  |  108  |  5<L>  ----------------------------<  98  3.3<L>   |  21  |  0.6<L>      Calcium, Total Serum: 8.3 mg/dL (06-17-22 @ 06:07)      LFTs:             5.6  | 0.3  | 15       ------------------[185     ( 17 Jun 2022 06:07 )  2.9  | x    | 14          Lipase:x      Amylase:x             Coags:     15.20  ----< 1.33    ( 17 Jun 2022 06:07 )     x                                BARIATRIC SURGERY PROGRESS NOTE    Patient: ZENON MEADOWS , 47y ([birth year])Female   MRN: [id number]  Location: [address] [facility] 010 A  Visit: 06-16-22 Inpatient  Date: 06-18-22 @ 03:56    Hospital Day #: 3  Post-Op Day #:    Procedure/Dx/Injuries: 47y F s/p sleeve gastrectomy complicated by a leak (5/18 Dr Tequila GLEZ) transferred to [facility] for endoscopic mgmt.    Events of past 24 hours: had EGD with GI with stent placement, f/u procedure note. Patien without pain, no nausea/vomiting, no tenderness    PAST MEDICAL & SURGICAL HISTORY:  Obesity      S/P gastric sleeve procedure          Vitals:   T(F): 98 (06-17-22 @ 20:01), Max: 98.1 (06-17-22 @ 15:25)  HR: 80 (06-17-22 @ 20:01)  BP: 135/60 (06-17-22 @ 20:01)  RR: 18 (06-17-22 @ 20:01)  SpO2: 80% (06-17-22 @ 17:24)      Diet, NPO      Fluids: lactated ringers.: Solution, 1000 milliLiter(s) infuse at 100 mL/Hr  Provider's Contact #: [phone]  lactated ringers.: Solution, 1000 milliLiter(s) infuse at 60 mL/Hr      I & O's:      Bowel Movement: : [x] YES [] NO  Flatus: : [x] YES [] NO    PHYSICAL EXAM:  General: Sad affect  HEENT: NCAT, YOLANDA, EOMI, Trachea ML, Neck supple  Cardiac: RRR S1, S2, no Murmurs, rubs or gallops  Respiratory: CTAB, normal respiratory effort, breath sounds equal BL, no wheeze, rhonchi or crackles  Abdomen: Soft, non-distended, non-tender    MEDICATIONS  (STANDING):  cefTRIAXone   IVPB 1000 milliGRAM(s) IV Intermittent every 24 hours  chlorhexidine 4% Liquid 1 Application(s) Topical <User Schedule>  enoxaparin Injectable 40 milliGRAM(s) SubCutaneous every 24 hours  lactated ringers. 1000 milliLiter(s) (100 mL/Hr) IV Continuous <Continuous>  lactated ringers. 1000 milliLiter(s) (60 mL/Hr) IV Continuous <Continuous>  losartan 100 milliGRAM(s) Oral daily  metroNIDAZOLE  IVPB      metroNIDAZOLE  IVPB 500 milliGRAM(s) IV Intermittent every 8 hours  ondansetron Injectable 4 milliGRAM(s) IV Push every 8 hours  pantoprazole  Injectable 40 milliGRAM(s) IV Push every 12 hours  Parenteral Nutrition - Adult 1 Each (65 mL/Hr) TPN Continuous <Continuous>    MEDICATIONS  (PRN):  acetaminophen     Tablet .. 650 milliGRAM(s) Oral every 6 hours PRN Mild Pain (1 - 3)  HYDROmorphone  Injectable 1 milliGRAM(s) IV Push every 10 minutes PRN Severe Pain (7 - 10)  HYDROmorphone  Injectable 0.5 milliGRAM(s) IV Push every 10 minutes PRN Moderate Pain (4 - 6)  morphine  - Injectable 2 milliGRAM(s) IV Push every 6 hours PRN Severe Pain (7 - 10)  ondansetron Injectable 4 milliGRAM(s) IV Push once PRN Nausea and/or Vomiting      DVT PROPHYLAXIS: enoxaparin Injectable 40 milliGRAM(s) SubCutaneous every 24 hours    GI PROPHYLAXIS: pantoprazole  Injectable 40 milliGRAM(s) IV Push every 12 hours    ANTICOAGULATION:   ANTIBIOTICS:  cefTRIAXone   IVPB 1000 milliGRAM(s)  metroNIDAZOLE  IVPB    metroNIDAZOLE  IVPB 500 milliGRAM(s)            LAB/STUDIES:  Labs:  CAPILLARY BLOOD GLUCOSE                              9.2    4.37  )-----------( 129      ( 17 Jun 2022 06:07 )             28.5       Auto Neutrophil %: 63.4 % (06-17-22 @ 06:07)  Auto Immature Granulocyte %: 0.5 % (06-17-22 @ 06:07)    06-17    145  |  108  |  5<L>  ----------------------------<  98  3.3<L>   |  21  |  0.6<L>      Calcium, Total Serum: 8.3 mg/dL (06-17-22 @ 06:07)      LFTs:             5.6  | 0.3  | 15       ------------------[185     ( 17 Jun 2022 06:07 )  2.9  | x    | 14          Lipase:x      Amylase:x             Coags:     15.20  ----< 1.33    ( 17 Jun 2022 06:07 )     x

## 2022-06-18 NOTE — PROGRESS NOTE ADULT - SUBJECTIVE AND OBJECTIVE BOX
CHIEF COMPLAINT:    Patient is a 47y old  Female who presents with a chief complaint of Abdominal pain and emesis     INTERVAL HPI/OVERNIGHT EVENTS:    Patient seen and examined at bedside. No acute overnight events occurred.    ROS: Denies abdominal pain. Feels improved since admission. All other systems are negative.    Medications:  Standing  cefTRIAXone   IVPB 1000 milliGRAM(s) IV Intermittent every 24 hours  chlorhexidine 4% Liquid 1 Application(s) Topical <User Schedule>  enoxaparin Injectable 40 milliGRAM(s) SubCutaneous every 24 hours  losartan 100 milliGRAM(s) Oral daily  metroNIDAZOLE  IVPB      metroNIDAZOLE  IVPB 500 milliGRAM(s) IV Intermittent every 8 hours  ondansetron Injectable 4 milliGRAM(s) IV Push every 8 hours  pantoprazole  Injectable 40 milliGRAM(s) IV Push every 12 hours  Parenteral Nutrition - Adult 1 Each TPN Continuous <Continuous>    PRN Meds  acetaminophen     Tablet .. 650 milliGRAM(s) Oral every 6 hours PRN  HYDROmorphone  Injectable 1 milliGRAM(s) IV Push every 10 minutes PRN  HYDROmorphone  Injectable 0.5 milliGRAM(s) IV Push every 10 minutes PRN  morphine  - Injectable 2 milliGRAM(s) IV Push every 6 hours PRN  ondansetron Injectable 4 milliGRAM(s) IV Push once PRN    Vital Signs:    T(F): 97.1 (06-18-22 @ 05:10), Max: 98.1 (06-17-22 @ 15:25)  HR: 63 (06-18-22 @ 05:10) (63 - 86)  BP: 170/79 (06-18-22 @ 05:10) (135/60 - 170/79)  RR: 18 (06-18-22 @ 05:10) (12 - 21)  SpO2: 80% (06-17-22 @ 17:24) (80% - 100%)  I&O's Summary    17 Jun 2022 07:01  -  18 Jun 2022 07:00  --------------------------------------------------------  IN: 2605 mL / OUT: 2 mL / NET: 2603 mL      Daily Height in cm: 167.64 (17 Jun 2022 15:44)    Daily   CAPILLARY BLOOD GLUCOSE          PHYSICAL EXAM:  GENERAL:  NAD  SKIN: No rashes or lesions  HEENT: Atraumatic. Normocephalic. Anicteric  NECK:  No JVD.   PULMONARY: Clear to ausculation bilaterally. No wheezing. No rales  CVS: Normal S1, S2. Regular rate and rhythm. No murmurs.  ABDOMEN/GI: Soft, Nontender, Nondistended; Bowel sounds are present  EXTREMITIES:  No edema B/L LE.  NEUROLOGIC:  No motor deficit.  PSYCH: Alert & oriented x 3, normal affect      LABS:                        9.1    4.90  )-----------( 129      ( 18 Jun 2022 06:53 )             27.3     06-18    139  |  104  |  6<L>  ----------------------------<  164<H>  3.6   |  23  |  0.7    Ca    8.4<L>      18 Jun 2022 06:53  Phos  3.1     06-18  Mg     2.5     06-18    TPro  5.7<L>  /  Alb  2.8<L>  /  TBili  0.2  /  DBili  x   /  AST  27  /  ALT  21  /  AlkPhos  163<H>  06-18    PT/INR - ( 17 Jun 2022 06:07 )   PT: 15.20 sec;   INR: 1.33 ratio                   RADIOLOGY & ADDITIONAL TESTS:  Imaging or report Personally Reviewed:  [ ] YES  [ ] NO -->no new images    Consultant(s) Notes Reviewed:  [ ] YES  [ ] NO  Care Discussed with Consultants/Other Providers [ ] YES  [ ] NO    Case discussed with resident  Care discussed with pt

## 2022-06-19 LAB
ACANTHOCYTES BLD QL SMEAR: SLIGHT — SIGNIFICANT CHANGE UP
ALBUMIN SERPL ELPH-MCNC: 2.9 G/DL — LOW (ref 3.5–5.2)
ALP SERPL-CCNC: 165 U/L — HIGH (ref 30–115)
ALT FLD-CCNC: 25 U/L — SIGNIFICANT CHANGE UP (ref 0–41)
ANION GAP SERPL CALC-SCNC: 11 MMOL/L — SIGNIFICANT CHANGE UP (ref 7–14)
ANISOCYTOSIS BLD QL: SLIGHT — SIGNIFICANT CHANGE UP
AST SERPL-CCNC: 31 U/L — SIGNIFICANT CHANGE UP (ref 0–41)
BASOPHILS # BLD AUTO: 0 K/UL — SIGNIFICANT CHANGE UP (ref 0–0.2)
BASOPHILS NFR BLD AUTO: 0 % — SIGNIFICANT CHANGE UP (ref 0–1)
BILIRUB SERPL-MCNC: <0.2 MG/DL — SIGNIFICANT CHANGE UP (ref 0.2–1.2)
BUN SERPL-MCNC: 7 MG/DL — LOW (ref 10–20)
CALCIUM SERPL-MCNC: 8.3 MG/DL — LOW (ref 8.5–10.1)
CHLORIDE SERPL-SCNC: 110 MMOL/L — SIGNIFICANT CHANGE UP (ref 98–110)
CO2 SERPL-SCNC: 22 MMOL/L — SIGNIFICANT CHANGE UP (ref 17–32)
CREAT SERPL-MCNC: 0.7 MG/DL — SIGNIFICANT CHANGE UP (ref 0.7–1.5)
EGFR: 108 ML/MIN/1.73M2 — SIGNIFICANT CHANGE UP
EOSINOPHIL # BLD AUTO: 0.34 K/UL — SIGNIFICANT CHANGE UP (ref 0–0.7)
EOSINOPHIL NFR BLD AUTO: 6.2 % — SIGNIFICANT CHANGE UP (ref 0–8)
GIANT PLATELETS BLD QL SMEAR: PRESENT — SIGNIFICANT CHANGE UP
GLUCOSE SERPL-MCNC: 151 MG/DL — HIGH (ref 70–99)
HCT VFR BLD CALC: 26.3 % — LOW (ref 37–47)
HGB BLD-MCNC: 8.5 G/DL — LOW (ref 12–16)
LYMPHOCYTES # BLD AUTO: 0.86 K/UL — LOW (ref 1.2–3.4)
LYMPHOCYTES # BLD AUTO: 15.8 % — LOW (ref 20.5–51.1)
MAGNESIUM SERPL-MCNC: 1.8 MG/DL — SIGNIFICANT CHANGE UP (ref 1.8–2.4)
MANUAL SMEAR VERIFICATION: SIGNIFICANT CHANGE UP
MCHC RBC-ENTMCNC: 28.7 PG — SIGNIFICANT CHANGE UP (ref 27–31)
MCHC RBC-ENTMCNC: 32.3 G/DL — SIGNIFICANT CHANGE UP (ref 32–37)
MCV RBC AUTO: 88.9 FL — SIGNIFICANT CHANGE UP (ref 81–99)
MICROCYTES BLD QL: SLIGHT — SIGNIFICANT CHANGE UP
MONOCYTES # BLD AUTO: 0.14 K/UL — SIGNIFICANT CHANGE UP (ref 0.1–0.6)
MONOCYTES NFR BLD AUTO: 2.6 % — SIGNIFICANT CHANGE UP (ref 1.7–9.3)
MYELOCYTES NFR BLD: 2.6 % — HIGH (ref 0–0)
NEUTROPHILS # BLD AUTO: 3.93 K/UL — SIGNIFICANT CHANGE UP (ref 1.4–6.5)
NEUTROPHILS NFR BLD AUTO: 71.9 % — SIGNIFICANT CHANGE UP (ref 42.2–75.2)
PHOSPHATE SERPL-MCNC: 3.7 MG/DL — SIGNIFICANT CHANGE UP (ref 2.1–4.9)
PLAT MORPH BLD: NORMAL — SIGNIFICANT CHANGE UP
PLATELET # BLD AUTO: 129 K/UL — LOW (ref 130–400)
POIKILOCYTOSIS BLD QL AUTO: SLIGHT — SIGNIFICANT CHANGE UP
POLYCHROMASIA BLD QL SMEAR: SLIGHT — SIGNIFICANT CHANGE UP
POTASSIUM SERPL-MCNC: 3.3 MMOL/L — LOW (ref 3.5–5)
POTASSIUM SERPL-SCNC: 3.3 MMOL/L — LOW (ref 3.5–5)
PROT SERPL-MCNC: 5.5 G/DL — LOW (ref 6–8)
RBC # BLD: 2.96 M/UL — LOW (ref 4.2–5.4)
RBC # FLD: 14.3 % — SIGNIFICANT CHANGE UP (ref 11.5–14.5)
RBC BLD AUTO: ABNORMAL
SODIUM SERPL-SCNC: 143 MMOL/L — SIGNIFICANT CHANGE UP (ref 135–146)
VARIANT LYMPHS # BLD: 0.9 % — SIGNIFICANT CHANGE UP (ref 0–5)
WBC # BLD: 5.47 K/UL — SIGNIFICANT CHANGE UP (ref 4.8–10.8)
WBC # FLD AUTO: 5.47 K/UL — SIGNIFICANT CHANGE UP (ref 4.8–10.8)

## 2022-06-19 PROCEDURE — 99232 SBSQ HOSP IP/OBS MODERATE 35: CPT

## 2022-06-19 RX ORDER — POTASSIUM CHLORIDE 20 MEQ
20 PACKET (EA) ORAL
Refills: 0 | Status: COMPLETED | OUTPATIENT
Start: 2022-06-19 | End: 2022-06-19

## 2022-06-19 RX ORDER — ELECTROLYTE SOLUTION,INJ
1 VIAL (ML) INTRAVENOUS
Refills: 0 | Status: DISCONTINUED | OUTPATIENT
Start: 2022-06-19 | End: 2022-06-19

## 2022-06-19 RX ADMIN — CEFTRIAXONE 100 MILLIGRAM(S): 500 INJECTION, POWDER, FOR SOLUTION INTRAMUSCULAR; INTRAVENOUS at 05:56

## 2022-06-19 RX ADMIN — Medication 100 MILLIGRAM(S): at 13:19

## 2022-06-19 RX ADMIN — LOSARTAN POTASSIUM 100 MILLIGRAM(S): 100 TABLET, FILM COATED ORAL at 05:56

## 2022-06-19 RX ADMIN — Medication 100 MILLIGRAM(S): at 21:00

## 2022-06-19 RX ADMIN — Medication 1 EACH: at 20:18

## 2022-06-19 RX ADMIN — CHLORHEXIDINE GLUCONATE 1 APPLICATION(S): 213 SOLUTION TOPICAL at 05:57

## 2022-06-19 RX ADMIN — ONDANSETRON 4 MILLIGRAM(S): 8 TABLET, FILM COATED ORAL at 05:56

## 2022-06-19 RX ADMIN — ONDANSETRON 4 MILLIGRAM(S): 8 TABLET, FILM COATED ORAL at 21:00

## 2022-06-19 RX ADMIN — Medication 50 MILLIEQUIVALENT(S): at 11:11

## 2022-06-19 RX ADMIN — Medication 100 MILLIGRAM(S): at 05:56

## 2022-06-19 RX ADMIN — HYDROMORPHONE HYDROCHLORIDE 1 MILLIGRAM(S): 2 INJECTION INTRAMUSCULAR; INTRAVENOUS; SUBCUTANEOUS at 06:00

## 2022-06-19 RX ADMIN — PANTOPRAZOLE SODIUM 40 MILLIGRAM(S): 20 TABLET, DELAYED RELEASE ORAL at 17:11

## 2022-06-19 RX ADMIN — ENOXAPARIN SODIUM 40 MILLIGRAM(S): 100 INJECTION SUBCUTANEOUS at 21:39

## 2022-06-19 RX ADMIN — Medication 50 MILLIEQUIVALENT(S): at 13:20

## 2022-06-19 RX ADMIN — ONDANSETRON 4 MILLIGRAM(S): 8 TABLET, FILM COATED ORAL at 13:19

## 2022-06-19 RX ADMIN — PANTOPRAZOLE SODIUM 40 MILLIGRAM(S): 20 TABLET, DELAYED RELEASE ORAL at 06:01

## 2022-06-19 NOTE — PROGRESS NOTE ADULT - SUBJECTIVE AND OBJECTIVE BOX
GENERAL SURGERY PROGRESS NOTE    Patient: ZENON MEADOWS , 47y ([birth year])Female   MRN: [id number]  Location: [address] Neuro 010 A  Visit: 06-16-22 Inpatient  Date: 06-19-22 @ 06:57    Hospital Day #: 4  Post-Op Day #: None    Procedure/Dx/Injuries: 47y F s/p sleeve gastrectomy complicated by a leak (5/18 Dr Tequila GLEZ) transferred to [facility] for endoscopic mgmt.    Events of past 24 hours: Patient was seen and evaluated at bedside, HD stable.    PAST MEDICAL & SURGICAL HISTORY:  Obesity      S/P gastric sleeve procedure          Vitals:   T(F): 97.1 (06-19-22 @ 05:13), Max: 99.2 (06-18-22 @ 16:00)  HR: 90 (06-19-22 @ 05:13)  BP: 161/77 (06-19-22 @ 05:13)  RR: 18 (06-19-22 @ 05:13)  SpO2: --          Fluids:     I & O's:    06-17-22 @ 07:01  -  06-18-22 @ 07:00  --------------------------------------------------------  IN:    IV PiggyBack: 100 mL    IV PiggyBack: 200 mL    IV PiggyBack: 50 mL    Lactated Ringers: 1300 mL    Oral Fluid: 240 mL    PPN (Peripheral Parenteral Nutrition): 715 mL  Total IN: 2605 mL    OUT:    Voided (mL): 2 mL  Total OUT: 2 mL    Total NET: 2603 mL        Bowel Movement : [x] YES [] NO  Flatus : [x] YES [] NO    PHYSICAL EXAM:  General: NAD, AAOx3, calm and cooperative  HEENT: EOMI, Trachea ML, Neck supple  Cardiac: RRR  Respiratory: normal respiratory effort, breath sounds equal BL, no wheeze, rhonchi or crackles  Abdomen: Soft, non-distended, non-tender, no rebound, no guarding   Vascular: Pulses 2+ throughout, extremities well perfused    MEDICATIONS  (STANDING):  cefTRIAXone   IVPB 1000 milliGRAM(s) IV Intermittent every 24 hours  chlorhexidine 4% Liquid 1 Application(s) Topical <User Schedule>  enoxaparin Injectable 40 milliGRAM(s) SubCutaneous every 24 hours  fat emulsion (Fish Oil and Plant Based) 20% Infusion 1.002 Gm/kG/Day (41.7 mL/Hr) IV Continuous <Continuous>  losartan 100 milliGRAM(s) Oral daily  metroNIDAZOLE  IVPB      metroNIDAZOLE  IVPB 500 milliGRAM(s) IV Intermittent every 8 hours  ondansetron Injectable 4 milliGRAM(s) IV Push every 8 hours  pantoprazole  Injectable 40 milliGRAM(s) IV Push every 12 hours  Parenteral Nutrition - Adult 1 Each (65 mL/Hr) TPN Continuous <Continuous>    MEDICATIONS  (PRN):  acetaminophen     Tablet .. 650 milliGRAM(s) Oral every 6 hours PRN Mild Pain (1 - 3)  HYDROmorphone  Injectable 1 milliGRAM(s) IV Push every 10 minutes PRN Severe Pain (7 - 10)  HYDROmorphone  Injectable 0.5 milliGRAM(s) IV Push every 10 minutes PRN Moderate Pain (4 - 6)  morphine  - Injectable 2 milliGRAM(s) IV Push every 6 hours PRN Severe Pain (7 - 10)      DVT PROPHYLAXIS: enoxaparin Injectable 40 milliGRAM(s) SubCutaneous every 24 hours    GI PROPHYLAXIS: pantoprazole  Injectable 40 milliGRAM(s) IV Push every 12 hours    ANTICOAGULATION:   ANTIBIOTICS:  cefTRIAXone   IVPB 1000 milliGRAM(s)  metroNIDAZOLE  IVPB    metroNIDAZOLE  IVPB 500 milliGRAM(s)            LAB/STUDIES:  Labs:  CAPILLARY BLOOD GLUCOSE                              9.1    4.90  )-----------( 129      ( 18 Jun 2022 06:53 )             27.3         06-18    139  |  104  |  6<L>  ----------------------------<  164<H>  3.6   |  23  |  0.7          LFTs:             5.7  | 0.2  | 27       ------------------[163     ( 18 Jun 2022 06:53 )  2.8  | x    | 21          Lipase:x      Amylase:x           ACCESS/ DEVICES:  [x] Peripheral IV   GENERAL SURGERY PROGRESS NOTE    Patient: ZENON MEADOWS , 47y ([birth year])Female   MRN: [id number]  Location: [address] Neuro 010 A  Visit: 06-16-22 Inpatient  Date: 06-19-22 @ 06:57    Hospital Day #: 4   Post-Op Day #: None    Procedure/Dx/Injuries: 47y F s/p sleeve gastrectomy complicated by a leak (5/18 Dr Tequila GLEZ) transferred to [facility] for endoscopic mgmt.    Events of past 24 hours: Patient was seen and evaluated at bedside, HD stable.    PAST MEDICAL & SURGICAL HISTORY:  Obesity      S/P gastric sleeve procedure          Vitals:   T(F): 97.1 (06-19-22 @ 05:13), Max: 99.2 (06-18-22 @ 16:00)  HR: 90 (06-19-22 @ 05:13)  BP: 161/77 (06-19-22 @ 05:13)  RR: 18 (06-19-22 @ 05:13)  SpO2: --          Fluids:     I & O's:    06-17-22 @ 07:01  -  06-18-22 @ 07:00  --------------------------------------------------------  IN:    IV PiggyBack: 100 mL    IV PiggyBack: 200 mL    IV PiggyBack: 50 mL    Lactated Ringers: 1300 mL    Oral Fluid: 240 mL    PPN (Peripheral Parenteral Nutrition): 715 mL  Total IN: 2605 mL    OUT:    Voided (mL): 2 mL  Total OUT: 2 mL    Total NET: 2603 mL        Bowel Movement : [x] YES [] NO  Flatus : [x] YES [] NO    PHYSICAL EXAM:  General: NAD, AAOx3, calm and cooperative  HEENT: EOMI, Trachea ML, Neck supple  Cardiac: RRR  Respiratory: normal respiratory effort, breath sounds equal BL, no wheeze, rhonchi or crackles  Abdomen: Soft, non-distended, non-tender, no rebound, no guarding   Vascular: Pulses 2+ throughout, extremities well perfused    MEDICATIONS  (STANDING):  cefTRIAXone   IVPB 1000 milliGRAM(s) IV Intermittent every 24 hours  chlorhexidine 4% Liquid 1 Application(s) Topical <User Schedule>  enoxaparin Injectable 40 milliGRAM(s) SubCutaneous every 24 hours  fat emulsion (Fish Oil and Plant Based) 20% Infusion 1.002 Gm/kG/Day (41.7 mL/Hr) IV Continuous <Continuous>  losartan 100 milliGRAM(s) Oral daily  metroNIDAZOLE  IVPB      metroNIDAZOLE  IVPB 500 milliGRAM(s) IV Intermittent every 8 hours  ondansetron Injectable 4 milliGRAM(s) IV Push every 8 hours  pantoprazole  Injectable 40 milliGRAM(s) IV Push every 12 hours  Parenteral Nutrition - Adult 1 Each (65 mL/Hr) TPN Continuous <Continuous>    MEDICATIONS  (PRN):  acetaminophen     Tablet .. 650 milliGRAM(s) Oral every 6 hours PRN Mild Pain (1 - 3)  HYDROmorphone  Injectable 1 milliGRAM(s) IV Push every 10 minutes PRN Severe Pain (7 - 10)  HYDROmorphone  Injectable 0.5 milliGRAM(s) IV Push every 10 minutes PRN Moderate Pain (4 - 6)  morphine  - Injectable 2 milliGRAM(s) IV Push every 6 hours PRN Severe Pain (7 - 10)      DVT PROPHYLAXIS: enoxaparin Injectable 40 milliGRAM(s) SubCutaneous every 24 hours    GI PROPHYLAXIS: pantoprazole  Injectable 40 milliGRAM(s) IV Push every 12 hours    ANTICOAGULATION:   ANTIBIOTICS:  cefTRIAXone   IVPB 1000 milliGRAM(s)  metroNIDAZOLE  IVPB    metroNIDAZOLE  IVPB 500 milliGRAM(s)            LAB/STUDIES:  Labs:  CAPILLARY BLOOD GLUCOSE                              9.1    4.90  )-----------( 129      ( 18 Jun 2022 06:53 )             27.3         06-18    139  |  104  |  6<L>  ----------------------------<  164<H>  3.6   |  23  |  0.7          LFTs:             5.7  | 0.2  | 27       ------------------[163     ( 18 Jun 2022 06:53 )  2.8  | x    | 21          Lipase:x      Amylase:x           ACCESS/ DEVICES:  [x] Peripheral IV

## 2022-06-19 NOTE — PROGRESS NOTE ADULT - ASSESSMENT
ASSESSMENT:  47y F s/p sleeve gastrectomy complicated by a leak (5/18 Dr Tequila GLEZ) transferred to [facility] for endoscopic mgmt.    PLAN:  - F/u GI report  - Pain Management  - Strict Ins and Out  - K>4, MG>2, Phos>3    Lines/Tubes: PIV    BLUE TEAM SPECTRA: [id number] ASSESSMENT:  47y F s/p sleeve gastrectomy complicated by a leak (5/18 Dr Tequila GLZE) transferred to [facility] for endoscopic mgmt.    PLAN:  - F/u GI report  - Pain Management  - Strict Ins and Out  - K>4, MG>2, Phos>3     Lines/Tubes: PIV    BLUE TEAM SPECTRA: [id number]

## 2022-06-19 NOTE — PROGRESS NOTE ADULT - ASSESSMENT
48 yo F PMHx of recent gastric sleeve (5/18/22) performed at [facility] with Dr Clarke Mandel who presents for evaluation of abdominal pain for 1 week. Pt initially presented to [facility] on 6/13 for evaluation of pain, was found to have a gastric sleeve leak and was transferred to [facility] for evaluation by her bariatric surgeon. On 6/16 she was transferred back to [facility] for endoscopy and suturing of leak. Pt underwent endoscopy yesterday, awaiting report. Pt requires time for gut to heal so TPN was initiated on 6/17.        Diffuse abdominal pain secondary to contained leak and/or abscess from gastric sleeve  - awaiting GI follow up/EGD report. If no abscess then dc abx as per ID. In interim c/w rocephin, flagyl  - monitor for fever/leukocytosis  - NPO status, c/w TPN  - resume PO diet when ok with GI, will need tapering off TPN when ok to eat      Asymptomatic COVID  - stable    HTN   -on home losartan 100mg  - c/w losartan    Nutritional deficiency  Hypokalemia  Hypomagnesmia  - replenished    Normocytic anemia  - likely due to gastric sleeve (iron deficiency  - hgb 11.5 on admission, now 9.1--suspect pt was hemoconcentrated initially and hgb of 9 is more accurate    #DVT px lovenox  #GI px PPI  #Diet: parenteral nutrition for now  #Code full  #Activity: AAT    #Progress Note Handoff:  Pending (specify):  GI follow up, [facility] records  Family discussion: spoke with pt regarding TPN   Disposition: Home__x_/SNF___/Other________/Unknown at this time________ likely dc around 6/21

## 2022-06-19 NOTE — PROGRESS NOTE ADULT - ASSESSMENT
46 yo F PMHx of recent gastric sleeve (5/18/22) performed at [facility] with Dr Clarke Mandel who presents for evaluation of abdominal pain for 1 week. Pt initially presented to [facility] on 6/13 for evaluation of pain, was found to have a gastric sleeve leak and was transferred to [facility] for evaluation by her bariatric surgeon. On 6/16 she was transferred back to [facility] for endoscopy and suturing of leak. Pt underwent endoscopy on 6/17 with suturing leak. Pt requires time for gut to heal so TPN was initiated on 6/17.      Diffuse abdominal pain secondary to contained leak and/or abscess from gastric sleeve  - I spoke with GI fellow-lots of debris and pus found. will c/w IV abx  - ID follow up appreciated--can change to levaquin and flagy x 14 days on discharge  - monitor for fever/leukocytosis  - NPO status, c/w TPN  - resume PO diet when ok with GI, will need tapering off TPN when ok to eat  - planned for gastrograffin swallow study tomorrow    Headache  - pt states she gets migraine, however her headache is global. States this happens every couple of weeks and is no different than prior. Normally takes motrin but NSAID should avoided due to current GI situation. Pt declined any medications, states she thinks its from too much meds and wants to let it go on its own.     Asymptomatic COVID  - stable    HTN   -on home losartan 100mg  - c/w losartan    Nutritional deficiency  Hypokalemia  Hypomagnesmia  - replenished    Normocytic anemia  - likely due to gastric sleeve (iron deficiency  - hgb 11.5 on admission, now 9.1--suspect pt was hemoconcentrated initially and hgb of 9 is more accurate    #DVT px lovenox  #GI px PPI  #Diet: parenteral nutrition for now  #Code full  #Activity: AAT    #Progress Note Handoff:  Pending (specify):  gastrograffi study tomorrow  Family discussion: spoke with pt regarding TPN   Disposition: Home__x_/SNF___/Other________/Unknown at this time________ likely dc around 6/22-6/23

## 2022-06-19 NOTE — CHART NOTE - NSCHARTNOTEFT_GEN_A_CORE
EGD report:  Normal duodenum.  	Grade A esophagitis in the lower third of the esophagus compatible with nonspecific erosive esophagitis.  	Previous Surgery in the gastric body and fundus.  	In the proximal stomach around 40 cm from the incisors, there was an area of erythema and edema. A 5mm full thickness defect was noted between the folds that was consistent with the suspected site of leak, and pus and purulent fluid was noted to be draining out of the opening and likely coming from the collection seen on CT.  	The endoscope was then exchanged to a  scope and the collection was evaluated and noted to have pus that was washed and suctioned. The scope was exchanged to standard endoscope loaded with a 12mm OVESCO clip that was successfully deployed to close the defect.  	Finally, using a double channel scope, a biliary balloon was advanced in the stomach and inflated to occlude the lumen. A canula was advanced through the other channel and contrast was injected above the balloon in the area of the prior leak. Under fluoroscopy, no leak was noted.    Pt denies any abdominal pain, nausea, vomiting, chest pain, abdominal pain  Hemodynamically stable     Plan:	  Keep NPO for now   Continue Abx  Repeat UGI series tomorrow   IV PPI BID  Will follow  TPN

## 2022-06-19 NOTE — PROGRESS NOTE ADULT - SUBJECTIVE AND OBJECTIVE BOX
ZENON MEADOWS  47y, Female  Allergy: No Known Allergies      LOS  3d    CHIEF COMPLAINT: Abdominal pain and emesis (19 Jun 2022 08:25)      INTERVAL EVENTS/HPI  - No acute events overnight  - T(F): , Max: 99.2 (06-18-22 @ 16:00)  - Denies any worsening symptoms  - Tolerating medication  - WBC Count: 5.47 (06-19-22 @ 08:17)  WBC Count: 4.90 (06-18-22 @ 06:53)     - Creatinine, Serum: 0.7 (06-19-22 @ 08:17)  Creatinine, Serum: 0.7 (06-18-22 @ 06:53)       ROS  General: Denies rigors, nightsweats  HEENT: Denies headache, rhinorrhea, sore throat, eye pain  CV: Denies CP, palpitations  PULM: Denies wheezing, hemoptysis  GI: Denies hematemesis, hematochezia, melena  : Denies discharge, hematuria  MSK: Denies arthralgias, myalgias  SKIN: Denies rash, lesions  NEURO: Denies paresthesias, weakness  PSYCH: Denies depression, anxiety    VITALS:  T(F): 97.1, Max: 99.2 (06-18-22 @ 16:00)  HR: 90  BP: 161/77  RR: 18Vital Signs Last 24 Hrs  T(C): 36.2 (19 Jun 2022 05:13), Max: 37.3 (18 Jun 2022 16:00)  T(F): 97.1 (19 Jun 2022 05:13), Max: 99.2 (18 Jun 2022 16:00)  HR: 90 (19 Jun 2022 05:13) (59 - 90)  BP: 161/77 (19 Jun 2022 05:13) (150/76 - 189/90)  BP(mean): 107 (18 Jun 2022 20:30) (107 - 128)  RR: 18 (19 Jun 2022 05:13) (18 - 18)  SpO2: --    PHYSICAL EXAM:  Gen: NAD, resting in bed  HEENT: Normocephalic, atraumatic  Neck: supple, no lymphadenopathy  CV: Regular rate & regular rhythm  Lungs: decreased BS at bases, no fremitus  Abdomen: Soft, BS present  Ext: Warm, well perfused  Neuro: non focal, awake  Skin: no rash, no erythema  Lines: no phlebitis    FH: Non-contributory  Social Hx: Non-contributory    TESTS & MEASUREMENTS:                        8.5    5.47  )-----------( 129      ( 19 Jun 2022 08:17 )             26.3     06-19    143  |  110  |  7<L>  ----------------------------<  151<H>  3.3<L>   |  22  |  0.7    Ca    8.3<L>      19 Jun 2022 08:17  Phos  3.7     06-19  Mg     1.8     06-19    TPro  5.5<L>  /  Alb  2.9<L>  /  TBili  <0.2  /  DBili  x   /  AST  31  /  ALT  25  /  AlkPhos  165<H>  06-19      LIVER FUNCTIONS - ( 19 Jun 2022 08:17 )  Alb: 2.9 g/dL / Pro: 5.5 g/dL / ALK PHOS: 165 U/L / ALT: 25 U/L / AST: 31 U/L / GGT: x               Culture - Urine (collected 06-13-22 @ 16:15)  Source: Clean Catch Clean Catch (Midstream)  Final Report (06-16-22 @ 12:03):    10,000 - 49,000 CFU/mL Proteus mirabilis    <10,000 CFU/ml Normal Urogenital george present  Organism: Proteus mirabilis (06-16-22 @ 12:03)  Organism: Proteus mirabilis (06-16-22 @ 12:03)      -  Amikacin: S <=16      -  Amoxicillin/Clavulanic Acid: S <=8/4      -  Ampicillin: S <=8 These ampicillin results predict results for amoxicillin      -  Ampicillin/Sulbactam: S <=4/2 Enterobacter, Klebsiella aerogenes, Citrobacter, and Serratia may develop resistance during prolonged therapy (3-4 days)      -  Aztreonam: S <=4      -  Cefazolin: S 16 (MIC_CL_COM_ENTERIC_CEFAZU) For uncomplicated UTI with K. pneumoniae, E. coli, or P. mirablis: CYNDI <=16 is sensitive and CYNDI >=32 is resistant. This also predicts results for oral agents cefaclor, cefdinir, cefpodoxime, cefprozil, cefuroxime axetil, cephalexin and locarbef for uncomplicated UTI. Note that some isolates may be susceptible to these agents while testing resistant to cefazolin.      -  Cefepime: S <=2      -  Cefoxitin: S <=8      -  Ceftriaxone: S <=1 Enterobacter, Klebsiella aerogenes, Citrobacter, and Serratia may develop resistance during prolonged therapy      -  Ciprofloxacin: S <=0.25      -  Ertapenem: S <=0.5      -  Gentamicin: S <=2      -  Levofloxacin: S <=0.5      -  Meropenem: S <=1      -  Nitrofurantoin: R >64 Should not be used to treat pyelonephritis      -  Piperacillin/Tazobactam: S <=8      -  Tobramycin: S <=2      -  Trimethoprim/Sulfamethoxazole: S <=0.5/9.5      Method Type: CYNDI            INFECTIOUS DISEASES TESTING  COVID-19 PCR: Detected (06-13-22 @ 14:30)      INFLAMMATORY MARKERS      RADIOLOGY & ADDITIONAL TESTS:  I have personally reviewed the last available Chest xray  CXR      CT      CARDIOLOGY TESTING  12 Lead ECG:   Ventricular Rate 89 BPM    Atrial Rate 89 BPM    P-R Interval 156 ms    QRS Duration 92 ms    Q-T Interval 328 ms    QTC Calculation(Bazett) 399 ms    P Axis 64 degrees    R Axis 85 degrees    T Axis 16 degrees    Diagnosis Line Normal sinus rhythm  Nonspecific T wave abnormality  Abnormal ECG    Confirmed by Cinthia Tobin MDfer ([id number]) on 6/13/2022 6:43:09 PM (06-13-22 @ 13:47)      MEDICATIONS  cefTRIAXone   IVPB 1000 IV Intermittent every 24 hours  chlorhexidine 4% Liquid 1 Topical <User Schedule>  enoxaparin Injectable 40 SubCutaneous every 24 hours  fat emulsion (Fish Oil and Plant Based) 20% Infusion 1.002 IV Continuous <Continuous>  losartan 100 Oral daily  metroNIDAZOLE  IVPB     metroNIDAZOLE  IVPB 500 IV Intermittent every 8 hours  ondansetron Injectable 4 IV Push every 8 hours  pantoprazole  Injectable 40 IV Push every 12 hours  Parenteral Nutrition - Adult 1 TPN Continuous <Continuous>  Parenteral Nutrition - Adult 1 TPN Continuous <Continuous>      WEIGHT  Weight (kg): 99.8 (06-17-22 @ 15:44)  Creatinine, Serum: 0.7 mg/dL (06-19-22 @ 08:17)      ANTIBIOTICS:  cefTRIAXone   IVPB 1000 milliGRAM(s) IV Intermittent every 24 hours  metroNIDAZOLE  IVPB      metroNIDAZOLE  IVPB 500 milliGRAM(s) IV Intermittent every 8 hours      All available historical records have been reviewed

## 2022-06-19 NOTE — PROGRESS NOTE ADULT - ASSESSMENT
ASSESSMENT  48 yo F with PMH gastric sleeve 5/18/22 at [facility] with Dr Clarke Mandel who presents with abd pain for a week . The patient was transferred from [facility] after CT scan showed fluid collection suspicious for contained leak .COVID + , tested positive in May    IMPRESSION  #Sepsis ruled out on admission   #Leak vs rule out abscess  EGD- In the proximal stomach around 40 cm from the incisors, there was an area of erythema and edema. A 5mm full thickness defect was noted between the folds that was consistent with the suspected site of leak, and pus and purulent fluid was noted to be draining out of the opening and likely coming from the collection seen on CT.    WBC 4  < from: CT Abdomen and Pelvis w/ Oral Cont and w/ IV Cont (06.13.22 @ 17:09) >  Prior sleeve gastrectomy with irregularity of the suture line and   adjacent fluid collection measuring up to 6.2 cm. Findings suspicious for   contained leak and/or abscess formation.  #Asymptomatic bacteriuria    UCX   10,000 - 49,000 CFU/mL Proteus mirabilis  #COVID-19 PCR: Detected (06-13-22 @ 14:30), tested positive in 5/2022    no symptoms  #Morbid Obesity BMI (kg/m2): 35.5    Creatinine, Serum: 0.6 (06-17-22 @ 06:07)    Weight (kg): 99.8 (06-17-22 @ 15:44)    RECOMMENDATION  cefTRIAXone   IVPB 1000 milliGRAM(s) IV Intermittent every 24 hours   metroNIDAZOLE  IVPB 500 milliGRAM(s) IV Intermittent every 8 hours  on D/C PO levaquin 500mg daily and Flagyl 500mg TID x 14 days  f/u GI/Surgery    If any questions, please call or send a message on [employer]  Please continue to update ID with any pertinent new laboratory or radiographic findings  Spectra [id number]

## 2022-06-19 NOTE — PROGRESS NOTE ADULT - SUBJECTIVE AND OBJECTIVE BOX
CHIEF COMPLAINT:    Patient is a 47y old  Female who presents with a chief complaint of Abdominal pain and emesis     INTERVAL HPI/OVERNIGHT EVENTS:    Patient seen and examined at bedside. No acute overnight events occurred.    ROS: Denies abdominal pain. All other systems are negative.    Medications:  Standing  cefTRIAXone   IVPB 1000 milliGRAM(s) IV Intermittent every 24 hours  chlorhexidine 4% Liquid 1 Application(s) Topical <User Schedule>  enoxaparin Injectable 40 milliGRAM(s) SubCutaneous every 24 hours  fat emulsion (Fish Oil and Plant Based) 20% Infusion 1.002 Gm/kG/Day IV Continuous <Continuous>  losartan 100 milliGRAM(s) Oral daily  metroNIDAZOLE  IVPB      metroNIDAZOLE  IVPB 500 milliGRAM(s) IV Intermittent every 8 hours  ondansetron Injectable 4 milliGRAM(s) IV Push every 8 hours  pantoprazole  Injectable 40 milliGRAM(s) IV Push every 12 hours  Parenteral Nutrition - Adult 1 Each TPN Continuous <Continuous>    PRN Meds  acetaminophen     Tablet .. 650 milliGRAM(s) Oral every 6 hours PRN  HYDROmorphone  Injectable 1 milliGRAM(s) IV Push every 10 minutes PRN  HYDROmorphone  Injectable 0.5 milliGRAM(s) IV Push every 10 minutes PRN  morphine  - Injectable 2 milliGRAM(s) IV Push every 6 hours PRN        Vital Signs:    T(F): 97.1 (06-19-22 @ 05:13), Max: 99.2 (06-18-22 @ 16:00)  HR: 90 (06-19-22 @ 05:13) (59 - 90)  BP: 161/77 (06-19-22 @ 05:13) (150/76 - 189/90)  RR: 18 (06-19-22 @ 05:13) (18 - 18)  SpO2: --  I&O's Summary    Daily     Daily   CAPILLARY BLOOD GLUCOSE          PHYSICAL EXAM:  GENERAL:  NAD  SKIN: No rashes or lesions  HEENT: Atraumatic. Normocephalic. Anicteric  NECK:  No JVD.   PULMONARY: Clear to ausculation bilaterally. No wheezing. No rales  CVS: Normal S1, S2. Regular rate and rhythm. No murmurs.  ABDOMEN/GI: Soft, Nontender, Nondistended; Bowel sounds are present  EXTREMITIES:  No edema B/L LE.  NEUROLOGIC:  No motor deficit.  PSYCH: Alert & oriented x 3, normal affect      LABS:                        9.1    4.90  )-----------( 129      ( 18 Jun 2022 06:53 )             27.3     06-18    139  |  104  |  6<L>  ----------------------------<  164<H>  3.6   |  23  |  0.7    Ca    8.4<L>      18 Jun 2022 06:53  Phos  3.1     06-18  Mg     2.5     06-18    TPro  5.7<L>  /  Alb  2.8<L>  /  TBili  0.2  /  DBili  x   /  AST  27  /  ALT  21  /  AlkPhos  163<H>  06-18              RADIOLOGY & ADDITIONAL TESTS:  Imaging or report Personally Reviewed:  [ ] YES  [ ] NO -->no new images    Telemetry reviewed independently - NSR, no acute events  EKG reviewed independently -->no new EKGs    Consultant(s) Notes Reviewed:  [ ] YES  [ ] NO  Care Discussed with Consultants/Other Providers [ ] YES  [ ] NO    Case discussed with resident  Care discussed with pt

## 2022-06-19 NOTE — PROGRESS NOTE ADULT - SUBJECTIVE AND OBJECTIVE BOX
CHIEF COMPLAINT:    Patient is a 47y old  Female who presents with a chief complaint of Abdominal pain and emesis     INTERVAL HPI/OVERNIGHT EVENTS:    Patient seen and examined at bedside. No acute overnight events occurred.  .  ROS: Denies SOB, chest pain. All other systems are negative.    Medications:  Standing  cefTRIAXone   IVPB 1000 milliGRAM(s) IV Intermittent every 24 hours  chlorhexidine 4% Liquid 1 Application(s) Topical <User Schedule>  enoxaparin Injectable 40 milliGRAM(s) SubCutaneous every 24 hours  fat emulsion (Fish Oil and Plant Based) 20% Infusion 1.002 Gm/kG/Day IV Continuous <Continuous>  losartan 100 milliGRAM(s) Oral daily  metroNIDAZOLE  IVPB      metroNIDAZOLE  IVPB 500 milliGRAM(s) IV Intermittent every 8 hours  ondansetron Injectable 4 milliGRAM(s) IV Push every 8 hours  pantoprazole  Injectable 40 milliGRAM(s) IV Push every 12 hours  Parenteral Nutrition - Adult 1 Each TPN Continuous <Continuous>  Parenteral Nutrition - Adult 1 Each TPN Continuous <Continuous>    PRN Meds  acetaminophen     Tablet .. 650 milliGRAM(s) Oral every 6 hours PRN  HYDROmorphone  Injectable 1 milliGRAM(s) IV Push every 10 minutes PRN  HYDROmorphone  Injectable 0.5 milliGRAM(s) IV Push every 10 minutes PRN  morphine  - Injectable 2 milliGRAM(s) IV Push every 6 hours PRN        Vital Signs:    T(F): 98.2 (06-19-22 @ 13:48), Max: 99.2 (06-18-22 @ 16:00)  HR: 67 (06-19-22 @ 13:48) (59 - 90)  BP: 184/104 (06-19-22 @ 13:48) (150/76 - 189/90)  RR: 19 (06-19-22 @ 13:48) (18 - 19)      PHYSICAL EXAM:  GENERAL:  NAD  SKIN: No rashes or lesions  HEENT: Atraumatic. Normocephalic. Anicteric  NECK:  No JVD.   PULMONARY: Clear to ausculation bilaterally. No wheezing. No rales  CVS: Normal S1, S2. Regular rate and rhythm. No murmurs.  ABDOMEN/GI: Soft, Nontender, Nondistended; Bowel sounds are present  EXTREMITIES:  No edema B/L LE.  NEUROLOGIC:  No motor deficit.  PSYCH: Alert & oriented x 3, normal affect      LABS:                        8.5    5.47  )-----------( 129      ( 19 Jun 2022 08:17 )             26.3     06-19    143  |  110  |  7<L>  ----------------------------<  151<H>  3.3<L>   |  22  |  0.7    Ca    8.3<L>      19 Jun 2022 08:17  Phos  3.7     06-19  Mg     1.8     06-19    TPro  5.5<L>  /  Alb  2.9<L>  /  TBili  <0.2  /  DBili  x   /  AST  31  /  ALT  25  /  AlkPhos  165<H>  06-19      RADIOLOGY & ADDITIONAL TESTS:  Imaging or report Personally Reviewed:  [ ] YES  [ ] NO -->no new images    Consultant(s) Notes Reviewed:  [ ] YES  [ ] NO  Care Discussed with Consultants/Other Providers [ ] YES  [ ] NO    Case discussed with resident  Care discussed with pt

## 2022-06-20 LAB
ALBUMIN SERPL ELPH-MCNC: 3 G/DL — LOW (ref 3.5–5.2)
ALP SERPL-CCNC: 150 U/L — HIGH (ref 30–115)
ALT FLD-CCNC: 21 U/L — SIGNIFICANT CHANGE UP (ref 0–41)
ANION GAP SERPL CALC-SCNC: 13 MMOL/L — SIGNIFICANT CHANGE UP (ref 7–14)
AST SERPL-CCNC: 19 U/L — SIGNIFICANT CHANGE UP (ref 0–41)
BASOPHILS # BLD AUTO: 0.05 K/UL — SIGNIFICANT CHANGE UP (ref 0–0.2)
BASOPHILS NFR BLD AUTO: 0.9 % — SIGNIFICANT CHANGE UP (ref 0–1)
BILIRUB SERPL-MCNC: 0.2 MG/DL — SIGNIFICANT CHANGE UP (ref 0.2–1.2)
BUN SERPL-MCNC: 11 MG/DL — SIGNIFICANT CHANGE UP (ref 10–20)
CALCIUM SERPL-MCNC: 8.8 MG/DL — SIGNIFICANT CHANGE UP (ref 8.5–10.1)
CHLORIDE SERPL-SCNC: 108 MMOL/L — SIGNIFICANT CHANGE UP (ref 98–110)
CO2 SERPL-SCNC: 21 MMOL/L — SIGNIFICANT CHANGE UP (ref 17–32)
CREAT SERPL-MCNC: 0.7 MG/DL — SIGNIFICANT CHANGE UP (ref 0.7–1.5)
EGFR: 108 ML/MIN/1.73M2 — SIGNIFICANT CHANGE UP
EOSINOPHIL # BLD AUTO: 0.19 K/UL — SIGNIFICANT CHANGE UP (ref 0–0.7)
EOSINOPHIL NFR BLD AUTO: 3.4 % — SIGNIFICANT CHANGE UP (ref 0–8)
GLUCOSE SERPL-MCNC: 114 MG/DL — HIGH (ref 70–99)
HCT VFR BLD CALC: 27.4 % — LOW (ref 37–47)
HGB BLD-MCNC: 8.8 G/DL — LOW (ref 12–16)
IMM GRANULOCYTES NFR BLD AUTO: 2.7 % — HIGH (ref 0.1–0.3)
LYMPHOCYTES # BLD AUTO: 1.43 K/UL — SIGNIFICANT CHANGE UP (ref 1.2–3.4)
LYMPHOCYTES # BLD AUTO: 25.7 % — SIGNIFICANT CHANGE UP (ref 20.5–51.1)
MAGNESIUM SERPL-MCNC: 1.7 MG/DL — LOW (ref 1.8–2.4)
MCHC RBC-ENTMCNC: 28.8 PG — SIGNIFICANT CHANGE UP (ref 27–31)
MCHC RBC-ENTMCNC: 32.1 G/DL — SIGNIFICANT CHANGE UP (ref 32–37)
MCV RBC AUTO: 89.5 FL — SIGNIFICANT CHANGE UP (ref 81–99)
MONOCYTES # BLD AUTO: 0.43 K/UL — SIGNIFICANT CHANGE UP (ref 0.1–0.6)
MONOCYTES NFR BLD AUTO: 7.7 % — SIGNIFICANT CHANGE UP (ref 1.7–9.3)
NEUTROPHILS # BLD AUTO: 3.32 K/UL — SIGNIFICANT CHANGE UP (ref 1.4–6.5)
NEUTROPHILS NFR BLD AUTO: 59.6 % — SIGNIFICANT CHANGE UP (ref 42.2–75.2)
NRBC # BLD: 0 /100 WBCS — SIGNIFICANT CHANGE UP (ref 0–0)
PHOSPHATE SERPL-MCNC: 3.5 MG/DL — SIGNIFICANT CHANGE UP (ref 2.1–4.9)
PLATELET # BLD AUTO: 142 K/UL — SIGNIFICANT CHANGE UP (ref 130–400)
POTASSIUM SERPL-MCNC: 3.7 MMOL/L — SIGNIFICANT CHANGE UP (ref 3.5–5)
POTASSIUM SERPL-SCNC: 3.7 MMOL/L — SIGNIFICANT CHANGE UP (ref 3.5–5)
PROT SERPL-MCNC: 5.7 G/DL — LOW (ref 6–8)
RBC # BLD: 3.06 M/UL — LOW (ref 4.2–5.4)
RBC # FLD: 14.5 % — SIGNIFICANT CHANGE UP (ref 11.5–14.5)
SODIUM SERPL-SCNC: 142 MMOL/L — SIGNIFICANT CHANGE UP (ref 135–146)
WBC # BLD: 5.57 K/UL — SIGNIFICANT CHANGE UP (ref 4.8–10.8)
WBC # FLD AUTO: 5.57 K/UL — SIGNIFICANT CHANGE UP (ref 4.8–10.8)

## 2022-06-20 PROCEDURE — 74220 X-RAY XM ESOPHAGUS 1CNTRST: CPT | Mod: 26

## 2022-06-20 PROCEDURE — 99232 SBSQ HOSP IP/OBS MODERATE 35: CPT

## 2022-06-20 RX ORDER — ELECTROLYTE SOLUTION,INJ
1 VIAL (ML) INTRAVENOUS
Refills: 0 | Status: DISCONTINUED | OUTPATIENT
Start: 2022-06-20 | End: 2022-06-20

## 2022-06-20 RX ORDER — NIFEDIPINE 30 MG
30 TABLET, EXTENDED RELEASE 24 HR ORAL ONCE
Refills: 0 | Status: COMPLETED | OUTPATIENT
Start: 2022-06-20 | End: 2022-06-20

## 2022-06-20 RX ORDER — I.V. FAT EMULSION 20 G/100ML
1 EMULSION INTRAVENOUS
Qty: 100 | Refills: 0 | Status: DISCONTINUED | OUTPATIENT
Start: 2022-06-20 | End: 2022-06-20

## 2022-06-20 RX ORDER — MAGNESIUM SULFATE 500 MG/ML
2 VIAL (ML) INJECTION ONCE
Refills: 0 | Status: COMPLETED | OUTPATIENT
Start: 2022-06-20 | End: 2022-06-20

## 2022-06-20 RX ADMIN — Medication 650 MILLIGRAM(S): at 02:29

## 2022-06-20 RX ADMIN — Medication 1 EACH: at 21:23

## 2022-06-20 RX ADMIN — Medication 100 MILLIGRAM(S): at 21:31

## 2022-06-20 RX ADMIN — Medication 650 MILLIGRAM(S): at 22:32

## 2022-06-20 RX ADMIN — ONDANSETRON 4 MILLIGRAM(S): 8 TABLET, FILM COATED ORAL at 21:31

## 2022-06-20 RX ADMIN — Medication 650 MILLIGRAM(S): at 20:06

## 2022-06-20 RX ADMIN — LOSARTAN POTASSIUM 100 MILLIGRAM(S): 100 TABLET, FILM COATED ORAL at 06:54

## 2022-06-20 RX ADMIN — Medication 25 GRAM(S): at 15:59

## 2022-06-20 RX ADMIN — Medication 100 MILLIGRAM(S): at 13:43

## 2022-06-20 RX ADMIN — PANTOPRAZOLE SODIUM 40 MILLIGRAM(S): 20 TABLET, DELAYED RELEASE ORAL at 17:55

## 2022-06-20 RX ADMIN — CEFTRIAXONE 100 MILLIGRAM(S): 500 INJECTION, POWDER, FOR SOLUTION INTRAMUSCULAR; INTRAVENOUS at 06:54

## 2022-06-20 RX ADMIN — ONDANSETRON 4 MILLIGRAM(S): 8 TABLET, FILM COATED ORAL at 06:54

## 2022-06-20 RX ADMIN — I.V. FAT EMULSION 41.7 GM/KG/DAY: 20 EMULSION INTRAVENOUS at 21:25

## 2022-06-20 RX ADMIN — Medication 30 MILLIGRAM(S): at 02:29

## 2022-06-20 RX ADMIN — Medication 650 MILLIGRAM(S): at 09:05

## 2022-06-20 RX ADMIN — ENOXAPARIN SODIUM 40 MILLIGRAM(S): 100 INJECTION SUBCUTANEOUS at 21:44

## 2022-06-20 RX ADMIN — Medication 100 MILLIGRAM(S): at 06:54

## 2022-06-20 RX ADMIN — ONDANSETRON 4 MILLIGRAM(S): 8 TABLET, FILM COATED ORAL at 13:43

## 2022-06-20 NOTE — PROVIDER CONTACT NOTE (OTHER) - SITUATION
Patient blood pressure elevated 189/91 hr 56. Patient denies any chest pain or discomfort at this time. MD made aware, As per MD administer Procardia 30ml and continue to monitor.

## 2022-06-20 NOTE — PROGRESS NOTE ADULT - ASSESSMENT
ASSESSMENT:  47y F s/p sleeve gastrectomy complicated by a leak (5/18 Dr Tequila GLEZ) transferred to [facility] for endoscopic management. S/P EGD (5cm full thickness defect was noted between the proximal gastric folds that was consistent with the suspected site of leak, and pus and purulent fluid was noted to be draining out of the opening,12mm OVESCO clip that was successfully deployed to close the defect, negative leak test.     PLAN:  -Management per primary team   -UGI series this morning   -Pain control   -Continue PPI  -ID: ceftriaxone 1g q24, flagyl 500mg q8h. On d/c PO levaquin 500mg qd and flagyl 500mg tid [id number]    -DVT ppx    BLUE TEAM SPECTRA: [id number]

## 2022-06-20 NOTE — PROGRESS NOTE ADULT - ASSESSMENT
46 yo F PMHx of recent gastric sleeve (5/18/22) performed at [facility] with Dr Clarke Mandel who presents for evaluation of abdominal pain for 1 week. Pt initially presented to [facility] on 6/13 for evaluation of pain, was found to have a gastric sleeve leak and was transferred to [facility] for evaluation by her bariatric surgeon. On 6/16 she was transferred back to [facility] for endoscopy and suturing of leak. Pt underwent endoscopy on 6/17 with suturing leak. Pt requires time for gut to heal so TPN was initiated on 6/17.      Diffuse abdominal pain secondary to contained leak and/or abscess from gastric sleeve  - lots of debris and pus found.  c/w IV abx  - ID follow up appreciated--can change to levaquin and flagly x 14 days on discharge  - monitor for fever/leukocytosis  - NPO status, c/w TPN  - f/u gastrograffin swallow study results  - resume PO diet when ok with GI, will need tapering off TPN when ok to eat    Headache  - improving    Asymptomatic COVID  - stable    HTN   -on home losartan 100mg  - c/w losartan    Nutritional deficiency  Hypokalemia  Hypomagnesmia  - replenished    Normocytic anemia  - likely due to gastric sleeve (iron deficiency  - hgb 11.5 on admission, now 9.1--suspect pt was hemoconcentrated initially and hgb of 9 is more accurate    #DVT px lovenox  #GI px PPI  #Diet: parenteral nutrition for now  #Code full  #Activity: AAT    #Progress Note Handoff:  Pending (specify):  gastrograffi study, tapering off TPN  Family discussion: spoke with pt regarding TPN   Disposition: Home__x_/SNF___/Other________/Unknown at this time________ likely dc around 6/22-6/23

## 2022-06-20 NOTE — PROGRESS NOTE ADULT - SUBJECTIVE AND OBJECTIVE BOX
CHIEF COMPLAINT:    Patient is a 47y old  Female who presents with a chief complaint of Abdominal pain and emesis    INTERVAL HPI/OVERNIGHT EVENTS:    Patient seen and examined at bedside. No acute overnight events occurred.    ROS: Reports improvement in headache, has uneasy stomach. All other systems are negative.    Medications:  Standing  cefTRIAXone   IVPB 1000 milliGRAM(s) IV Intermittent every 24 hours  chlorhexidine 4% Liquid 1 Application(s) Topical <User Schedule>  enoxaparin Injectable 40 milliGRAM(s) SubCutaneous every 24 hours  losartan 100 milliGRAM(s) Oral daily  metroNIDAZOLE  IVPB      metroNIDAZOLE  IVPB 500 milliGRAM(s) IV Intermittent every 8 hours  ondansetron Injectable 4 milliGRAM(s) IV Push every 8 hours  pantoprazole  Injectable 40 milliGRAM(s) IV Push every 12 hours  Parenteral Nutrition - Adult 1 Each TPN Continuous <Continuous>    PRN Meds  acetaminophen     Tablet .. 650 milliGRAM(s) Oral every 6 hours PRN  HYDROmorphone  Injectable 1 milliGRAM(s) IV Push every 10 minutes PRN  HYDROmorphone  Injectable 0.5 milliGRAM(s) IV Push every 10 minutes PRN  morphine  - Injectable 2 milliGRAM(s) IV Push every 6 hours PRN        Vital Signs:    T(F): 97.2 (06-20-22 @ 08:29), Max: 98.5 (06-20-22 @ 01:00)  HR: 72 (06-20-22 @ 08:29) (55 - 82)  BP: 145/70 (06-20-22 @ 08:29) (145/70 - 189/91)  RR: 18 (06-20-22 @ 01:00) (18 - 19)  SpO2: --  I&O's Summary    Daily     Daily   CAPILLARY BLOOD GLUCOSE          PHYSICAL EXAM:  GENERAL:  NAD  SKIN: No rashes or lesions  HEENT: Atraumatic. Normocephalic. Anicteric  NECK:  No JVD.   PULMONARY: Clear to ausculation bilaterally. No wheezing. No rales  CVS: Normal S1, S2. Regular rate and rhythm. No murmurs.  ABDOMEN/GI: Soft, Nontender, Nondistended; Bowel sounds are present  EXTREMITIES:  No edema B/L LE.  NEUROLOGIC:  No motor deficit.  PSYCH: Alert & oriented x 3, normal affect      LABS:                        8.8    5.57  )-----------( 142      ( 20 Jun 2022 08:21 )             27.4     06-20    142  |  108  |  11  ----------------------------<  114<H>  3.7   |  21  |  0.7    Ca    8.8      20 Jun 2022 08:21  Phos  3.5     06-20  Mg     1.7     06-20    TPro  5.7<L>  /  Alb  3.0<L>  /  TBili  0.2  /  DBili  x   /  AST  19  /  ALT  21  /  AlkPhos  150<H>  06-20              RADIOLOGY & ADDITIONAL TESTS:  Imaging or report Personally Reviewed:  [ ] YES  [ ] NO -->no new images      Consultant(s) Notes Reviewed:  [ ] YES  [ ] NO  Care Discussed with Consultants/Other Providers [ ] YES  [ ] NO    Case discussed with resident  Care discussed with pt

## 2022-06-20 NOTE — PROGRESS NOTE ADULT - ATTENDING COMMENTS
46yo female s/p sleeve gastrectomy at [facility] admitted for staple line leak s/p endoscopic clip application. Doing well this morning, pain minimal, febrile. UGI today. Will recommend CLD if negative for persistent leak.

## 2022-06-20 NOTE — PROGRESS NOTE ADULT - SUBJECTIVE AND OBJECTIVE BOX
Gastroenterology progress note:     Patient is a 47y old  Female who presents with a chief complaint of Abdominal pain and emesis (20 Jun 2022 12:57)       Admitted on: 06-16-22    We are following the patient for: leak s/p gastric sleeve      Interval History:  Pt denies any abdominal pain, nausea, vomiting, chest pain, abdominal pain  Hemodynamically stable     PAST MEDICAL & SURGICAL HISTORY:  Obesity      S/P gastric sleeve procedure          MEDICATIONS  (STANDING):  cefTRIAXone   IVPB 1000 milliGRAM(s) IV Intermittent every 24 hours  chlorhexidine 4% Liquid 1 Application(s) Topical <User Schedule>  enoxaparin Injectable 40 milliGRAM(s) SubCutaneous every 24 hours  losartan 100 milliGRAM(s) Oral daily  metroNIDAZOLE  IVPB      metroNIDAZOLE  IVPB 500 milliGRAM(s) IV Intermittent every 8 hours  ondansetron Injectable 4 milliGRAM(s) IV Push every 8 hours  pantoprazole  Injectable 40 milliGRAM(s) IV Push every 12 hours  Parenteral Nutrition - Adult 1 Each (65 mL/Hr) TPN Continuous <Continuous>    MEDICATIONS  (PRN):  acetaminophen     Tablet .. 650 milliGRAM(s) Oral every 6 hours PRN Mild Pain (1 - 3)  HYDROmorphone  Injectable 1 milliGRAM(s) IV Push every 10 minutes PRN Severe Pain (7 - 10)  HYDROmorphone  Injectable 0.5 milliGRAM(s) IV Push every 10 minutes PRN Moderate Pain (4 - 6)  morphine  - Injectable 2 milliGRAM(s) IV Push every 6 hours PRN Severe Pain (7 - 10)      Allergies  No Known Allergies      Review of Systems:   Constitutional: Ne Fever, No chills, No weakness  ENT: No visual changes, No throat pain  Cardiovascular:  No Chest Pain, No Palpitations  Respiratory:  No Cough, No Dyspnea  Gastrointestinal:  As described in HPI  Neurological: No numbness or weakness  Skin: No rash, no itching    Physical Examination:  T(C): 36.2 (06-20-22 @ 08:29), Max: 36.9 (06-20-22 @ 01:00)  HR: 72 (06-20-22 @ 08:29) (55 - 82)  BP: 145/70 (06-20-22 @ 08:29) (145/70 - 189/91)  RR: 18 (06-20-22 @ 01:00) (18 - 19)  SpO2: --      Constitutional: No acute distress.  Respiratory:  No signs of respiratory distress. Lung sounds are clear bilaterally.  Cardiovascular:  S1 S2, Regular rate and rhythm.  Abdominal: Abdomen is soft, symmetric, and non-tender without distention. There are no visible lesions. Bowel sounds are present and normoactive in all four quadrants. No masses, hepatomegaly, or splenomegaly are noted.   Skin: No rashes, No Jaundice.  Neurology: AAOX3, Non-focal  Skin: No rash, No excoriation  Vascular: No varicose vein, No cyanosis, No edema        Data: (reviewed by attending)                        8.8    5.57  )-----------( 142      ( 20 Jun 2022 08:21 )             27.4     Hgb trend:  8.8  06-20-22 @ 08:21  8.5  06-19-22 @ 08:17  9.1  06-18-22 @ 06:53        06-20    142  |  108  |  11  ----------------------------<  114<H>  3.7   |  21  |  0.7    Ca    8.8      20 Jun 2022 08:21  Phos  3.5     06-20  Mg     1.7     06-20    TPro  5.7<L>  /  Alb  3.0<L>  /  TBili  0.2  /  DBili  x   /  AST  19  /  ALT  21  /  AlkPhos  150<H>  06-20    Liver panel trend:  TBili 0.2   /   AST 19   /   ALT 21   /   AlkP 150   /   Tptn 5.7   /   Alb 3.0    /   DBili --      06-20  TBili <0.2   /   AST 31   /   ALT 25   /   AlkP 165   /   Tptn 5.5   /   Alb 2.9    /   DBili --      06-19  TBili 0.2   /   AST 27   /   ALT 21   /   AlkP 163   /   Tptn 5.7   /   Alb 2.8    /   DBili --      06-18  TBili 0.3   /   AST 15   /   ALT 14   /   AlkP 185   /   Tptn 5.6   /   Alb 2.9    /   DBili --      06-17  TBili 1.2   /   AST 35   /   ALT 24   /   AlkP 206   /   Tptn 7.6   /   Alb 3.9    /   DBili --      06-13

## 2022-06-20 NOTE — CHART NOTE - NSCHARTNOTEFT_GEN_A_CORE
T(F): 97.2 (06-20-22 @ 08:29), Max: 98.5 (06-20-22 @ 01:00)  HR: 72 (06-20-22 @ 08:29) (55 - 82)  BP: 145/70 (06-20-22 @ 08:29) (145/70 - 189/91)  RR: 18 (06-20-22 @ 01:00) (18 - 19)    MEDICATIONS  (STANDING):  cefTRIAXone   IVPB 1000 milliGRAM(s) IV Intermittent every 24 hours  chlorhexidine 4% Liquid 1 Application(s) Topical <User Schedule>  enoxaparin Injectable 40 milliGRAM(s) SubCutaneous every 24 hours  losartan 100 milliGRAM(s) Oral daily  metroNIDAZOLE  IVPB      metroNIDAZOLE  IVPB 500 milliGRAM(s) IV Intermittent every 8 hours  ondansetron Injectable 4 milliGRAM(s) IV Push every 8 hours  pantoprazole  Injectable 40 milliGRAM(s) IV Push every 12 hours  Parenteral Nutrition - Adult 1 Each (65 mL/Hr) TPN Continuous <Continuous>    MEDICATIONS  (PRN):  acetaminophen     Tablet .. 650 milliGRAM(s) Oral every 6 hours PRN Mild Pain (1 - 3)  HYDROmorphone  Injectable 1 milliGRAM(s) IV Push every 10 minutes PRN Severe Pain (7 - 10)  HYDROmorphone  Injectable 0.5 milliGRAM(s) IV Push every 10 minutes PRN Moderate Pain (4 - 6)  morphine  - Injectable 2 milliGRAM(s) IV Push every 6 hours PRN Severe Pain (7 - 10)    06-20    142  |  108  |  11  ----------------------------<  114<H>  3.7   |  21  |  0.7    Ca    8.8      20 Jun 2022 08:21  Phos  3.5     06-20  Mg     1.7     06-20  Triglycerides, Serum: 114 mg/dL (06.18.22 @ 06:53)    TPro  5.7<L>  /  Alb  3.0<L>  /  TBili  0.2  /  DBili  x   /  AST  19  /  ALT  21  /  AlkPhos  150<H>  06-20                    8.8    5.57  )-----------( 142      ( 20 Jun 2022 08:21 )             27.4     Diet, NPO:   Except Medications (06-19-22 @ 11:02)    IMP:  - s/p gastric sleeve 5/18 at [facility]  - contained leak vs abscess   - covid +  - morbid obesity    PLAN  - cont TPN tonight   - daily bmp, in phos, mg while on parenteral nutrition UGI series today to r/o leak  TPN infusing via PICC day#3  s/p EGD -   Normal duodenum.  	Grade A esophagitis in the lower third of the esophagus compatible with nonspecific erosive esophagitis.  	Previous Surgery in the gastric body and fundus.  	In the proximal stomach around 40 cm from the incisors, there was an area of erythema and edema. A 5mm full thickness defect was noted between the folds that was consistent with the suspected site of leak, and pus and purulent fluid was noted to be draining out of the opening and likely coming from the collection seen on CT.  	The endoscope was then exchanged to a  scope and the collection was evaluated and noted to have pus that was washed and suctioned. The scope was exchanged to standard endoscope loaded with a 12mm OVESCO clip that was successfully deployed to close the defect.  	Finally, using a double channel scope, a biliary balloon was advanced in the stomach and inflated to occlude the lumen. A canula was advanced through the other channel and contrast was injected above the balloon in the area of the prior leak. Under fluoroscopy, no leak was noted.    T(F): 97.2 (22 @ 08:29), Max: 98.5 (22 @ 01:00)  HR: 72 (22 @ 08:29) (55 - 82)  BP: 145/70 (22 @ 08:29) (145/70 - 189/91)  RR: 18 (22 @ 01:00) (18 - 19)    MEDICATIONS  (STANDING):  cefTRIAXone   IVPB 1000 milliGRAM(s) IV Intermittent every 24 hours  chlorhexidine 4% Liquid 1 Application(s) Topical <User Schedule>  enoxaparin Injectable 40 milliGRAM(s) SubCutaneous every 24 hours  losartan 100 milliGRAM(s) Oral daily  metroNIDAZOLE  IVPB      metroNIDAZOLE  IVPB 500 milliGRAM(s) IV Intermittent every 8 hours  ondansetron Injectable 4 milliGRAM(s) IV Push every 8 hours  pantoprazole  Injectable 40 milliGRAM(s) IV Push every 12 hours  Parenteral Nutrition - Adult 1 Each (65 mL/Hr) TPN Continuous <Continuous>    MEDICATIONS  (PRN):  acetaminophen     Tablet .. 650 milliGRAM(s) Oral every 6 hours PRN Mild Pain (1 - 3)  HYDROmorphone  Injectable 1 milliGRAM(s) IV Push every 10 minutes PRN Severe Pain (7 - 10)  HYDROmorphone  Injectable 0.5 milliGRAM(s) IV Push every 10 minutes PRN Moderate Pain (4 - 6)  morphine  - Injectable 2 milliGRAM(s) IV Push every 6 hours PRN Severe Pain (7 - 10)        142  |  108  |  11  ----------------------------<  114<H>  3.7   |  21  |  0.7    Ca    8.8      2022 08:21  Phos  3.5       Mg     1.7       Triglycerides, Serum: 114 mg/dL (22 @ 06:53)    TPro  5.7<L>  /  Alb  3.0<L>  /  TBili  0.2  /  DBili  x   /  AST  19  /  ALT  21  /  AlkPhos  150<H>                      8.8    5.57  )-----------( 142      ( 2022 08:21 )             27.4     Diet, NPO:   Except Medications (22 @ 11:02)    IMP:  - s/p gastric sleeve  at [facility]  - contained leak vs abscess   - covid +  - morbid obesity    PLAN  - cont TPN tonight   - daily bmp, in phos, mg while on parenteral nutrition  - if start PO give bariatric phase 1 diet to start and assess tolerance  - will follow UGI series today to r/o leak  TPN infusing via PICC day#3  s/p EGD -   Normal duodenum.  	Grade A esophagitis in the lower third of the esophagus compatible with nonspecific erosive esophagitis.  	Previous Surgery in the gastric body and fundus.  	In the proximal stomach around 40 cm from the incisors, there was an area of erythema and edema. A 5mm full thickness defect was noted between the folds that was consistent with the suspected site of leak, and pus and purulent fluid was noted to be draining out of the opening and likely coming from the collection seen on CT.  	The endoscope was then exchanged to a  scope and the collection was evaluated and noted to have pus that was washed and suctioned. The scope was exchanged to standard endoscope loaded with a 12mm OVESCO clip that was successfully deployed to close the defect.  	Finally, using a double channel scope, a biliary balloon was advanced in the stomach and inflated to occlude the lumen. A canula was advanced through the other channel and contrast was injected above the balloon in the area of the prior leak. Under fluoroscopy, no leak was noted.    T(F): 97.2 (22 @ 08:29), Max: 98.5 (22 @ 01:00)  HR: 72 (22 @ 08:29) (55 - 82)  BP: 145/70 (22 @ 08:29) (145/70 - 189/91)  RR: 18 (22 @ 01:00) (18 - 19)    MEDICATIONS  (STANDING):  cefTRIAXone   IVPB 1000 milliGRAM(s) IV Intermittent every 24 hours  chlorhexidine 4% Liquid 1 Application(s) Topical <User Schedule>  enoxaparin Injectable 40 milliGRAM(s) SubCutaneous every 24 hours  losartan 100 milliGRAM(s) Oral daily  metroNIDAZOLE  IVPB      metroNIDAZOLE  IVPB 500 milliGRAM(s) IV Intermittent every 8 hours  ondansetron Injectable 4 milliGRAM(s) IV Push every 8 hours  pantoprazole  Injectable 40 milliGRAM(s) IV Push every 12 hours  Parenteral Nutrition - Adult 1 Each (65 mL/Hr) TPN Continuous <Continuous>    MEDICATIONS  (PRN):  acetaminophen     Tablet .. 650 milliGRAM(s) Oral every 6 hours PRN Mild Pain (1 - 3)  HYDROmorphone  Injectable 1 milliGRAM(s) IV Push every 10 minutes PRN Severe Pain (7 - 10)  HYDROmorphone  Injectable 0.5 milliGRAM(s) IV Push every 10 minutes PRN Moderate Pain (4 - 6)  morphine  - Injectable 2 milliGRAM(s) IV Push every 6 hours PRN Severe Pain (7 - 10)        142  |  108  |  11  ----------------------------<  114<H>  3.7   |  21  |  0.7    Ca    8.8      2022 08:21  Phos  3.5       Mg     1.7       Triglycerides, Serum: 114 mg/dL (22 @ 06:53)    TPro  5.7<L>  /  Alb  3.0<L>  /  TBili  0.2  /  DBili  x   /  AST  19  /  ALT  21  /  AlkPhos  150<H>                      8.8    5.57  )-----------( 142      ( 2022 08:21 )             27.4     Diet, NPO:   Except Medications (22 @ 11:02)    IMP:  - s/p gastric sleeve  at [facility]  - contained leak vs abscess --- see results of EGD noted above  - covid +  - morbid obesity    PLAN  - cont TPN tonight   - daily bmp, in phos, mg while on parenteral nutrition  - if start PO give bariatric phase 1 diet to start and assess tolerance  - will follow

## 2022-06-20 NOTE — PROGRESS NOTE ADULT - SUBJECTIVE AND OBJECTIVE BOX
ZENON MEADOWS 47y Female  MRN#: [id number]     Hospital Day: 4d    Pt is currently admitted with the primary diagnosis of  POST SURGERY LEAK        SUBJECTIVE     Overnight events  None    Subjective complaints  Pt was evaluated this am. Patient reported improved abdominal pain. She also reported headache from lack of sleep due to changing her room at night.                                            ----------------------------------------------------------  OBJECTIVE  PAST MEDICAL & SURGICAL HISTORY  Obesity    S/P gastric sleeve procedure                                              -----------------------------------------------------------  ALLERGIES:  No Known Allergies                                            ------------------------------------------------------------    HOME MEDICATIONS  Home Medications:  hydroCHLOROthiazide 12.5 mg oral capsule: 1 cap(s) orally once a day (16 Jun 2022 23:25)  losartan 100 mg oral tablet: 1 tab(s) orally once a day (16 Jun 2022 23:25)  Vitamin B-12 100 mcg oral tablet: 1 tab(s) orally once a day (16 Jun 2022 23:26)                           MEDICATIONS:  STANDING MEDICATIONS  cefTRIAXone   IVPB 1000 milliGRAM(s) IV Intermittent every 24 hours  chlorhexidine 4% Liquid 1 Application(s) Topical <User Schedule>  enoxaparin Injectable 40 milliGRAM(s) SubCutaneous every 24 hours  losartan 100 milliGRAM(s) Oral daily  magnesium sulfate  IVPB 2 Gram(s) IV Intermittent once  metroNIDAZOLE  IVPB      metroNIDAZOLE  IVPB 500 milliGRAM(s) IV Intermittent every 8 hours  ondansetron Injectable 4 milliGRAM(s) IV Push every 8 hours  pantoprazole  Injectable 40 milliGRAM(s) IV Push every 12 hours  Parenteral Nutrition - Adult 1 Each TPN Continuous <Continuous>    PRN MEDICATIONS  acetaminophen     Tablet .. 650 milliGRAM(s) Oral every 6 hours PRN  HYDROmorphone  Injectable 1 milliGRAM(s) IV Push every 10 minutes PRN  HYDROmorphone  Injectable 0.5 milliGRAM(s) IV Push every 10 minutes PRN  morphine  - Injectable 2 milliGRAM(s) IV Push every 6 hours PRN                                            ------------------------------------------------------------  VITAL SIGNS: Last 24 Hours  T(C): 36.2 (20 Jun 2022 08:29), Max: 36.9 (20 Jun 2022 01:00)  T(F): 97.2 (20 Jun 2022 08:29), Max: 98.5 (20 Jun 2022 01:00)  HR: 72 (20 Jun 2022 08:29) (55 - 82)  BP: 145/70 (20 Jun 2022 08:29) (145/70 - 189/91)  BP(mean): 125 (19 Jun 2022 21:00) (125 - 125)  RR: 18 (20 Jun 2022 01:00) (18 - 18)  SpO2: --                                             --------------------------------------------------------------  LABS:                        8.8    5.57  )-----------( 142      ( 20 Jun 2022 08:21 )             27.4     06-20    142  |  108  |  11  ----------------------------<  114<H>  3.7   |  21  |  0.7    Ca    8.8      20 Jun 2022 08:21  Phos  3.5     06-20  Mg     1.7     06-20    TPro  5.7<L>  /  Alb  3.0<L>  /  TBili  0.2  /  DBili  x   /  AST  19  /  ALT  21  /  AlkPhos  150<H>  06-20                                                              -------------------------------------------------------------  RADIOLOGY:  < from: Xray Esophagram Single Contrast (06.20.22 @ 10:20) >  Limited esophagram demonstrates no extraluminal contrast to suggest leak.    < end of copied text >      PHYSICAL EXAM:  GENERAL: NAD, lying in bed comfortably  HEAD:  Atraumatic, Normocephalic  EYES: EOMI, conjunctiva and sclera clear  ENT: Moist mucous membranes  NECK: Supple, No JVD  CHEST/LUNG: Clear to auscultation bilaterally; No rales, rhonchi, wheezing, or rubs. Unlabored respirations  HEART: regular rate and rhythm; No murmurs, rubs, or gallops  ABDOMEN: Bowel sounds present; Soft, Nontender, Nondistended.    EXTREMITIES: Warm. No clubbing, cyanosis, or edema  NERVOUS SYSTEM:  Alert & Oriented X3. No focal deficits   SKIN: No rashes or lesions                                           --------------------------------------------------------------

## 2022-06-20 NOTE — PROGRESS NOTE ADULT - ASSESSMENT
48 yo F with w/ hx of obesity s/p gastric sleeve on 22 at [facility] with Dr Clarke Mandel, being evaluated for concern of leak at the anastomotic site.     #leak s/p gastric sleeve   #fluid collection on CT  EGD report:  Normal duodenum.  	Grade A esophagitis in the lower third of the esophagus compatible with nonspecific erosive esophagitis.  	Previous Surgery in the gastric body and fundus.  	In the proximal stomach around 40 cm from the incisors, there was an area of erythema and edema. A 5mm full thickness defect was noted between the folds that was consistent with the suspected site of leak, and pus and purulent fluid was noted to be draining out of the opening and likely coming from the collection seen on CT.  	The endoscope was then exchanged to a  scope and the collection was evaluated and noted to have pus that was washed and suctioned. The scope was exchanged to standard endoscope loaded with a 12mm OVESCO clip that was successfully deployed to close the defect.  	Finally, using a double channel scope, a biliary balloon was advanced in the stomach and inflated to occlude the lumen. A canula was advanced through the other channel and contrast was injected above the balloon in the area of the prior leak. Under fluoroscopy, no leak was noted.    Pt denies any abdominal pain, nausea, vomiting, chest pain, abdominal pain  Hemodynamically stable     Plan:	  Keep NPO for now   Continue Abx  C/w Parenteral feding  UGI series with gastrographin today--> if no leak start clear liquid diet  IV PPI BID  Will follow  TPN.

## 2022-06-20 NOTE — PROGRESS NOTE ADULT - ATTENDING COMMENTS
SP closure of gastric perforation/leak secondary to lap gastric sleeve. Improving. tolerating clears with out extraluminal extravasation on UGI series.

## 2022-06-20 NOTE — PROGRESS NOTE ADULT - SUBJECTIVE AND OBJECTIVE BOX
`GENERAL SURGERY PROGRESS NOTE    Patient: ZENON MEADOWS , 47y ([birth year])Female   MRN: [id number]  Location: [address]  Visit: 06-16-22 Inpatient  Date: 06-20-22 @ 01:54    Hospital Day #:5    Procedure/Dx/Injuries: 47y F s/p sleeve gastrectomy complicated by a leak (5/18 Dr Tequila GLEZ) transferred to [facility] for endoscopic mgmt.    Events of past 24 hours: Pt seen and examined at bedside. Pain well controlled. ID gave discharge abx recommendations. No acute overnight events. Plan for UGI this morning. Afebrile     PAST MEDICAL & SURGICAL HISTORY:  Obesity  S/P gastric sleeve procedure    Vitals:   T(F): 97.8 (06-19-22 @ 21:00), Max: 98.7 (06-19-22 @ 05:07)  HR: 80 (06-19-22 @ 22:36)  BP: 146/95 (06-19-22 @ 22:36)  RR: 18 (06-19-22 @ 21:00)  SpO2: --    Diet, NPO:   Except Medications    PHYSICAL EXAM:  General: NAD  HEENT: NCAT, EOMI  Cardiac: S1, S2  Respiratory: normal respiratory effort, b/l breath sounds  Abdomen: Soft, non-distended, non-tender, no rebound, no guarding  Skin: no jaundice    MEDICATIONS  (STANDING):  cefTRIAXone   IVPB 1000 milliGRAM(s) IV Intermittent every 24 hours  chlorhexidine 4% Liquid 1 Application(s) Topical <User Schedule>  enoxaparin Injectable 40 milliGRAM(s) SubCutaneous every 24 hours  losartan 100 milliGRAM(s) Oral daily  metroNIDAZOLE  IVPB      metroNIDAZOLE  IVPB 500 milliGRAM(s) IV Intermittent every 8 hours  ondansetron Injectable 4 milliGRAM(s) IV Push every 8 hours  pantoprazole  Injectable 40 milliGRAM(s) IV Push every 12 hours  Parenteral Nutrition - Adult 1 Each (65 mL/Hr) TPN Continuous <Continuous>  Parenteral Nutrition - Adult 1 Each (65 mL/Hr) TPN Continuous <Continuous>    MEDICATIONS  (PRN):  acetaminophen     Tablet .. 650 milliGRAM(s) Oral every 6 hours PRN Mild Pain (1 - 3)  HYDROmorphone  Injectable 1 milliGRAM(s) IV Push every 10 minutes PRN Severe Pain (7 - 10)  HYDROmorphone  Injectable 0.5 milliGRAM(s) IV Push every 10 minutes PRN Moderate Pain (4 - 6)  morphine  - Injectable 2 milliGRAM(s) IV Push every 6 hours PRN Severe Pain (7 - 10)    DVT PROPHYLAXIS: enoxaparin Injectable 40 milliGRAM(s) SubCutaneous every 24 hours  GI PROPHYLAXIS: pantoprazole  Injectable 40 milliGRAM(s) IV Push every 12 hours  ANTIBIOTICS:  cefTRIAXone   IVPB 1000 milliGRAM(s)  metroNIDAZOLE  IVPB    metroNIDAZOLE  IVPB 500 milliGRAM(s)    LAB/STUDIES:  Labs:                        8.5    5.47  )-----------( 129      ( 19 Jun 2022 08:17 )             26.3       Auto Neutrophil %: 71.9 % (06-19-22 @ 08:17)    06-19    143  |  110  |  7<L>  ----------------------------<  151<H>  3.3<L>   |  22  |  0.7    Calcium, Total Serum: 8.3 mg/dL (06-19-22 @ 08:17)    LFTs:             5.5  | <0.2 | 31       ------------------[165     ( 19 Jun 2022 08:17 )  2.9  | x    | 25          Lipase:x      Amylase:x        IMAGING:  No new imaging past 24hrs   BARIATRIC SURGERY PROGRESS NOTE    Patient: ZENON MEADOWS , 47y ([birth year])Female   MRN: [id number]  Location: [address]  Visit: 06-16-22 Inpatient  Date: 06-20-22 @ 01:54    Hospital Day #:5    Procedure/Dx/Injuries: 47y F s/p sleeve gastrectomy complicated by a leak (5/18 Dr Tequila GLEZ) transferred to [facility] for endoscopic mgmt.    Events of past 24 hours: Pt seen and examined at bedside. Pain well controlled. ID gave discharge abx recommendations. No acute overnight events. Plan for UGI this morning. Afebrile     PAST MEDICAL & SURGICAL HISTORY:  Obesity  S/P gastric sleeve procedure    Vitals:   T(F): 97.8 (06-19-22 @ 21:00), Max: 98.7 (06-19-22 @ 05:07)  HR: 80 (06-19-22 @ 22:36)  BP: 146/95 (06-19-22 @ 22:36)  RR: 18 (06-19-22 @ 21:00)  SpO2: --    Diet, NPO:   Except Medications    PHYSICAL EXAM:  General: NAD  HEENT: NCAT, EOMI  Cardiac: S1, S2  Respiratory: normal respiratory effort, b/l breath sounds  Abdomen: Soft, non-distended, non-tender, no rebound, no guarding  Skin: no jaundice    MEDICATIONS  (STANDING):  cefTRIAXone   IVPB 1000 milliGRAM(s) IV Intermittent every 24 hours  chlorhexidine 4% Liquid 1 Application(s) Topical <User Schedule>  enoxaparin Injectable 40 milliGRAM(s) SubCutaneous every 24 hours  losartan 100 milliGRAM(s) Oral daily  metroNIDAZOLE  IVPB      metroNIDAZOLE  IVPB 500 milliGRAM(s) IV Intermittent every 8 hours  ondansetron Injectable 4 milliGRAM(s) IV Push every 8 hours  pantoprazole  Injectable 40 milliGRAM(s) IV Push every 12 hours  Parenteral Nutrition - Adult 1 Each (65 mL/Hr) TPN Continuous <Continuous>  Parenteral Nutrition - Adult 1 Each (65 mL/Hr) TPN Continuous <Continuous>    MEDICATIONS  (PRN):  acetaminophen     Tablet .. 650 milliGRAM(s) Oral every 6 hours PRN Mild Pain (1 - 3)  HYDROmorphone  Injectable 1 milliGRAM(s) IV Push every 10 minutes PRN Severe Pain (7 - 10)  HYDROmorphone  Injectable 0.5 milliGRAM(s) IV Push every 10 minutes PRN Moderate Pain (4 - 6)  morphine  - Injectable 2 milliGRAM(s) IV Push every 6 hours PRN Severe Pain (7 - 10)    DVT PROPHYLAXIS: enoxaparin Injectable 40 milliGRAM(s) SubCutaneous every 24 hours  GI PROPHYLAXIS: pantoprazole  Injectable 40 milliGRAM(s) IV Push every 12 hours  ANTIBIOTICS:  cefTRIAXone   IVPB 1000 milliGRAM(s)  metroNIDAZOLE  IVPB    metroNIDAZOLE  IVPB 500 milliGRAM(s)    LAB/STUDIES:  Labs:                        8.5    5.47  )-----------( 129      ( 19 Jun 2022 08:17 )             26.3       Auto Neutrophil %: 71.9 % (06-19-22 @ 08:17)    06-19    143  |  110  |  7<L>  ----------------------------<  151<H>  3.3<L>   |  22  |  0.7    Calcium, Total Serum: 8.3 mg/dL (06-19-22 @ 08:17)    LFTs:             5.5  | <0.2 | 31       ------------------[165     ( 19 Jun 2022 08:17 )  2.9  | x    | 25          Lipase:x      Amylase:x        IMAGING:  No new imaging past 24hrs

## 2022-06-20 NOTE — PROGRESS NOTE ADULT - ASSESSMENT
· Assessment	  46 yo F with PMH gastric sleeve 5/18/22 at [facility] with Dr Clarke Mandel who presents with abd pain for a week . The patient was transferred from [facility] after CT scan showed fluid collection suspicious for contained leak. The patient originally presented here at [facility] and was transferred to [facility] then transferred back after ct scan findings.    Currently admitted to medicine with the primary diagnosis of abd fluid collection      #Diffuse abdominal pain secondary to contained leak and/or abscess  #S/P gastric sleeve surgery 5/18/22 at [facility]  -hemodynamically stable ,afebrile ,  -transferred from [facility] for concern of contained leak  -sp gastric sleeve 5/18/22 at [facility] with Dr Clarke Mandel   -CT 6/13/22 Prior sleeve gastrectomy with irregularity of the suture line and   adjacent fluid collection measuring up to 6.2 cm. Findings suspicious for   contained leak and/or abscess formation.  - c/w Zofran and morphine IV PRN     - GI consulted, s/p EGD 6/19 -patient reports stents being placed? no official EGD report yet   - bariatric surgery following  - ID consulted, recs appreciated   - Nutrition: was on TPN. Since gastrografin did not show a leak, clear liquid diet started. If tolerated, will taper off TPN.        #Mild COVID, asymptomatic  - On room air  - continue to monitor    #Epistaxis - resolved  - cont. to monitor    #HTN   -on home losartan 100mg  - c/w losartan   · Assessment	  46 yo F with PMH gastric sleeve 5/18/22 at [facility] with Dr Clarke Mandel who presents with abd pain for a week . The patient was transferred from [facility] after CT scan showed fluid collection suspicious for contained leak. The patient originally presented here at [facility] and was transferred to [facility] then transferred back after ct scan findings.    Currently admitted to medicine with the primary diagnosis of abd fluid collection      #Diffuse abdominal pain secondary to contained leak and/or abscess  #S/P gastric sleeve surgery 5/18/22 at [facility]  -hemodynamically stable ,afebrile ,  -transferred from [facility] for concern of contained leak  -sp gastric sleeve 5/18/22 at [facility] with Dr Clarke Mandel   -CT 6/13/22 Prior sleeve gastrectomy with irregularity of the suture line and   adjacent fluid collection measuring up to 6.2 cm. Findings suspicious for   contained leak and/or abscess formation.  - c/w Zofran and morphine IV PRN     - GI consulted, s/p EGD 6/19 -patient reports stents being placed? no official EGD report yet   - bariatric surgery following  - ID consulted, recs appreciated   - Nutrition: was on TPN. Since gastrografin did not show a leak, clear liquid diet started. If tolerated, will taper off TPN.        #Mild COVID (detected 6/13), asymptomatic  - On room air  - continue to monitor    #Epistaxis - resolved  - cont. to monitor    #HTN   -on home losartan 100mg  - c/w losartan

## 2022-06-21 LAB
ALBUMIN SERPL ELPH-MCNC: 3 G/DL — LOW (ref 3.5–5.2)
ALP SERPL-CCNC: 128 U/L — HIGH (ref 30–115)
ALT FLD-CCNC: 18 U/L — SIGNIFICANT CHANGE UP (ref 0–41)
ANION GAP SERPL CALC-SCNC: 13 MMOL/L — SIGNIFICANT CHANGE UP (ref 7–14)
AST SERPL-CCNC: 17 U/L — SIGNIFICANT CHANGE UP (ref 0–41)
BASOPHILS # BLD AUTO: 0.04 K/UL — SIGNIFICANT CHANGE UP (ref 0–0.2)
BASOPHILS NFR BLD AUTO: 0.7 % — SIGNIFICANT CHANGE UP (ref 0–1)
BILIRUB SERPL-MCNC: <0.2 MG/DL — SIGNIFICANT CHANGE UP (ref 0.2–1.2)
BUN SERPL-MCNC: 9 MG/DL — LOW (ref 10–20)
CALCIUM SERPL-MCNC: 8.2 MG/DL — LOW (ref 8.5–10.1)
CHLORIDE SERPL-SCNC: 109 MMOL/L — SIGNIFICANT CHANGE UP (ref 98–110)
CO2 SERPL-SCNC: 21 MMOL/L — SIGNIFICANT CHANGE UP (ref 17–32)
CREAT SERPL-MCNC: 0.7 MG/DL — SIGNIFICANT CHANGE UP (ref 0.7–1.5)
EGFR: 108 ML/MIN/1.73M2 — SIGNIFICANT CHANGE UP
EOSINOPHIL # BLD AUTO: 0.23 K/UL — SIGNIFICANT CHANGE UP (ref 0–0.7)
EOSINOPHIL NFR BLD AUTO: 4.1 % — SIGNIFICANT CHANGE UP (ref 0–8)
GLUCOSE SERPL-MCNC: 141 MG/DL — HIGH (ref 70–99)
HCT VFR BLD CALC: 27 % — LOW (ref 37–47)
HGB BLD-MCNC: 8.7 G/DL — LOW (ref 12–16)
IMM GRANULOCYTES NFR BLD AUTO: 2 % — HIGH (ref 0.1–0.3)
LYMPHOCYTES # BLD AUTO: 1.26 K/UL — SIGNIFICANT CHANGE UP (ref 1.2–3.4)
LYMPHOCYTES # BLD AUTO: 22.6 % — SIGNIFICANT CHANGE UP (ref 20.5–51.1)
MAGNESIUM SERPL-MCNC: 1.8 MG/DL — SIGNIFICANT CHANGE UP (ref 1.8–2.4)
MCHC RBC-ENTMCNC: 29 PG — SIGNIFICANT CHANGE UP (ref 27–31)
MCHC RBC-ENTMCNC: 32.2 G/DL — SIGNIFICANT CHANGE UP (ref 32–37)
MCV RBC AUTO: 90 FL — SIGNIFICANT CHANGE UP (ref 81–99)
MONOCYTES # BLD AUTO: 0.42 K/UL — SIGNIFICANT CHANGE UP (ref 0.1–0.6)
MONOCYTES NFR BLD AUTO: 7.5 % — SIGNIFICANT CHANGE UP (ref 1.7–9.3)
NEUTROPHILS # BLD AUTO: 3.52 K/UL — SIGNIFICANT CHANGE UP (ref 1.4–6.5)
NEUTROPHILS NFR BLD AUTO: 63.1 % — SIGNIFICANT CHANGE UP (ref 42.2–75.2)
NRBC # BLD: 0 /100 WBCS — SIGNIFICANT CHANGE UP (ref 0–0)
PHOSPHATE SERPL-MCNC: 4.5 MG/DL — SIGNIFICANT CHANGE UP (ref 2.1–4.9)
PLATELET # BLD AUTO: 143 K/UL — SIGNIFICANT CHANGE UP (ref 130–400)
POTASSIUM SERPL-MCNC: 4 MMOL/L — SIGNIFICANT CHANGE UP (ref 3.5–5)
POTASSIUM SERPL-SCNC: 4 MMOL/L — SIGNIFICANT CHANGE UP (ref 3.5–5)
PROT SERPL-MCNC: 5.6 G/DL — LOW (ref 6–8)
RBC # BLD: 3 M/UL — LOW (ref 4.2–5.4)
RBC # FLD: 14.5 % — SIGNIFICANT CHANGE UP (ref 11.5–14.5)
SODIUM SERPL-SCNC: 143 MMOL/L — SIGNIFICANT CHANGE UP (ref 135–146)
WBC # BLD: 5.58 K/UL — SIGNIFICANT CHANGE UP (ref 4.8–10.8)
WBC # FLD AUTO: 5.58 K/UL — SIGNIFICANT CHANGE UP (ref 4.8–10.8)

## 2022-06-21 PROCEDURE — 99233 SBSQ HOSP IP/OBS HIGH 50: CPT

## 2022-06-21 PROCEDURE — 99232 SBSQ HOSP IP/OBS MODERATE 35: CPT

## 2022-06-21 RX ORDER — ELECTROLYTE SOLUTION,INJ
1 VIAL (ML) INTRAVENOUS
Refills: 0 | Status: DISCONTINUED | OUTPATIENT
Start: 2022-06-21 | End: 2022-06-21

## 2022-06-21 RX ADMIN — CEFTRIAXONE 100 MILLIGRAM(S): 500 INJECTION, POWDER, FOR SOLUTION INTRAMUSCULAR; INTRAVENOUS at 05:31

## 2022-06-21 RX ADMIN — ONDANSETRON 4 MILLIGRAM(S): 8 TABLET, FILM COATED ORAL at 05:08

## 2022-06-21 RX ADMIN — CHLORHEXIDINE GLUCONATE 1 APPLICATION(S): 213 SOLUTION TOPICAL at 05:08

## 2022-06-21 RX ADMIN — HYDROMORPHONE HYDROCHLORIDE 0.5 MILLIGRAM(S): 2 INJECTION INTRAMUSCULAR; INTRAVENOUS; SUBCUTANEOUS at 06:23

## 2022-06-21 RX ADMIN — PANTOPRAZOLE SODIUM 40 MILLIGRAM(S): 20 TABLET, DELAYED RELEASE ORAL at 05:08

## 2022-06-21 RX ADMIN — Medication 100 MILLIGRAM(S): at 13:29

## 2022-06-21 RX ADMIN — ONDANSETRON 4 MILLIGRAM(S): 8 TABLET, FILM COATED ORAL at 13:29

## 2022-06-21 RX ADMIN — Medication 100 MILLIGRAM(S): at 05:07

## 2022-06-21 RX ADMIN — PANTOPRAZOLE SODIUM 40 MILLIGRAM(S): 20 TABLET, DELAYED RELEASE ORAL at 18:19

## 2022-06-21 RX ADMIN — Medication 100 MILLIGRAM(S): at 21:57

## 2022-06-21 RX ADMIN — ENOXAPARIN SODIUM 40 MILLIGRAM(S): 100 INJECTION SUBCUTANEOUS at 21:54

## 2022-06-21 RX ADMIN — Medication 1 EACH: at 21:56

## 2022-06-21 RX ADMIN — LOSARTAN POTASSIUM 100 MILLIGRAM(S): 100 TABLET, FILM COATED ORAL at 05:08

## 2022-06-21 RX ADMIN — ONDANSETRON 4 MILLIGRAM(S): 8 TABLET, FILM COATED ORAL at 21:55

## 2022-06-21 NOTE — PROGRESS NOTE ADULT - SUBJECTIVE AND OBJECTIVE BOX
GENERAL SURGERY PROGRESS NOTE    Patient: ZENON MEADOWS , 47y ([birth year])Female   MRN: [id number]  Location: [address]  Visit: 06-16-22 Inpatient  Date: 06-21-22 @ 06:43    Hospital Day #: 6  Post-Op Day #: None    Procedure/Dx/Injuries: 47y F s/p sleeve gastrectomy complicated by a leak (5/18 Dr Tequila GLEZ) transferred to [facility] for endoscopic mgmt.    Events of past 24 hours: UGI showed no leaks. Patient was advanced to CLD, tolerating.     PAST MEDICAL & SURGICAL HISTORY:  Obesity    S/P gastric sleeve procedure    Vitals:   T(F): 97 (06-21-22 @ 00:00), Max: 97.5 (06-20-22 @ 15:30)  HR: 67 (06-21-22 @ 06:28)  BP: 149/73 (06-21-22 @ 06:28)  RR: 18 (06-20-22 @ 15:30)  SpO2: --      Diet, Clear Liquid    Bowel Movement : [x] YES [] NO  Flatus : [x] YES [] NO    PHYSICAL EXAM:  General: NAD, AAOx3, calm and cooperative  HEENT: EOMI, Trachea ML, Neck supple  Cardiac: RRR  Respiratory: normal respiratory effort, breath sounds equal BL, no wheeze, rhonchi or crackles  Abdomen: Soft, non-distended, non-tender, no rebound, no guarding   Vascular: Pulses 2+ throughout, extremities well perfused    MEDICATIONS  (STANDING):  cefTRIAXone   IVPB 1000 milliGRAM(s) IV Intermittent every 24 hours  chlorhexidine 4% Liquid 1 Application(s) Topical <User Schedule>  enoxaparin Injectable 40 milliGRAM(s) SubCutaneous every 24 hours  fat emulsion (Fish Oil and Plant Based) 20% Infusion 1.002 Gm/kG/Day (41.7 mL/Hr) IV Continuous <Continuous>  losartan 100 milliGRAM(s) Oral daily  metroNIDAZOLE  IVPB      metroNIDAZOLE  IVPB 500 milliGRAM(s) IV Intermittent every 8 hours  ondansetron Injectable 4 milliGRAM(s) IV Push every 8 hours  pantoprazole  Injectable 40 milliGRAM(s) IV Push every 12 hours  Parenteral Nutrition - Adult 1 Each (65 mL/Hr) TPN Continuous <Continuous>    MEDICATIONS  (PRN):  acetaminophen     Tablet .. 650 milliGRAM(s) Oral every 6 hours PRN Mild Pain (1 - 3)  HYDROmorphone  Injectable 0.5 milliGRAM(s) IV Push every 10 minutes PRN Moderate Pain (4 - 6)  HYDROmorphone  Injectable 1 milliGRAM(s) IV Push every 10 minutes PRN Severe Pain (7 - 10)  morphine  - Injectable 2 milliGRAM(s) IV Push every 6 hours PRN Severe Pain (7 - 10)      DVT PROPHYLAXIS: enoxaparin Injectable 40 milliGRAM(s) SubCutaneous every 24 hours    GI PROPHYLAXIS: pantoprazole  Injectable 40 milliGRAM(s) IV Push every 12 hours    ANTICOAGULATION:   ANTIBIOTICS:  cefTRIAXone   IVPB 1000 milliGRAM(s)  metroNIDAZOLE  IVPB    metroNIDAZOLE  IVPB 500 milliGRAM(s)    LAB/STUDIES:  Labs:  CAPILLARY BLOOD GLUCOSE                              8.8    5.57  )-----------( 142      ( 20 Jun 2022 08:21 )             27.4       Auto Neutrophil %: 59.6 % (06-20-22 @ 08:21)  Auto Immature Granulocyte %: 2.7 % (06-20-22 @ 08:21)    06-20    142  |  108  |  11  ----------------------------<  114<H>  3.7   |  21  |  0.7      Calcium, Total Serum: 8.8 mg/dL (06-20-22 @ 08:21)      LFTs:             5.7  | 0.2  | 19       ------------------[150     ( 20 Jun 2022 08:21 )  3.0  | x    | 21          Lipase:x      Amylase:x        ACCESS/ DEVICES:  [x] Peripheral IV     BARIATRIC SURGERY PROGRESS NOTE    Patient: ZENON MEADOWS , 47y ([birth year])Female   MRN: [id number]  Location: [address]  Visit: 06-16-22 Inpatient  Date: 06-21-22 @ 06:43    Hospital Day #: 6  Post-Op Day #: None    Procedure/Dx/Injuries: 47y F s/p sleeve gastrectomy complicated by a leak (5/18 Dr Tequila GLEZ) transferred to [facility] for endoscopic mgmt.    Events of past 24 hours: UGI showed no leaks. Patient was advanced to CLD, tolerating.     PAST MEDICAL & SURGICAL HISTORY:  Obesity    S/P gastric sleeve procedure    Vitals:   T(F): 97 (06-21-22 @ 00:00), Max: 97.5 (06-20-22 @ 15:30)  HR: 67 (06-21-22 @ 06:28)  BP: 149/73 (06-21-22 @ 06:28)  RR: 18 (06-20-22 @ 15:30)  SpO2: --      Diet, Clear Liquid    Bowel Movement : [x] YES [] NO  Flatus : [x] YES [] NO    PHYSICAL EXAM:  General: NAD, AAOx3, calm and cooperative  HEENT: EOMI, Trachea ML, Neck supple  Cardiac: RRR  Respiratory: normal respiratory effort, breath sounds equal BL, no wheeze, rhonchi or crackles  Abdomen: Soft, non-distended, non-tender, no rebound, no guarding   Vascular: Pulses 2+ throughout, extremities well perfused    MEDICATIONS  (STANDING):  cefTRIAXone   IVPB 1000 milliGRAM(s) IV Intermittent every 24 hours  chlorhexidine 4% Liquid 1 Application(s) Topical <User Schedule>  enoxaparin Injectable 40 milliGRAM(s) SubCutaneous every 24 hours  fat emulsion (Fish Oil and Plant Based) 20% Infusion 1.002 Gm/kG/Day (41.7 mL/Hr) IV Continuous <Continuous>  losartan 100 milliGRAM(s) Oral daily  metroNIDAZOLE  IVPB      metroNIDAZOLE  IVPB 500 milliGRAM(s) IV Intermittent every 8 hours  ondansetron Injectable 4 milliGRAM(s) IV Push every 8 hours  pantoprazole  Injectable 40 milliGRAM(s) IV Push every 12 hours  Parenteral Nutrition - Adult 1 Each (65 mL/Hr) TPN Continuous <Continuous>    MEDICATIONS  (PRN):  acetaminophen     Tablet .. 650 milliGRAM(s) Oral every 6 hours PRN Mild Pain (1 - 3)  HYDROmorphone  Injectable 0.5 milliGRAM(s) IV Push every 10 minutes PRN Moderate Pain (4 - 6)  HYDROmorphone  Injectable 1 milliGRAM(s) IV Push every 10 minutes PRN Severe Pain (7 - 10)  morphine  - Injectable 2 milliGRAM(s) IV Push every 6 hours PRN Severe Pain (7 - 10)      DVT PROPHYLAXIS: enoxaparin Injectable 40 milliGRAM(s) SubCutaneous every 24 hours    GI PROPHYLAXIS: pantoprazole  Injectable 40 milliGRAM(s) IV Push every 12 hours    ANTICOAGULATION:   ANTIBIOTICS:  cefTRIAXone   IVPB 1000 milliGRAM(s)  metroNIDAZOLE  IVPB    metroNIDAZOLE  IVPB 500 milliGRAM(s)    LAB/STUDIES:  Labs:  CAPILLARY BLOOD GLUCOSE                              8.8    5.57  )-----------( 142      ( 20 Jun 2022 08:21 )             27.4       Auto Neutrophil %: 59.6 % (06-20-22 @ 08:21)  Auto Immature Granulocyte %: 2.7 % (06-20-22 @ 08:21)    06-20    142  |  108  |  11  ----------------------------<  114<H>  3.7   |  21  |  0.7      Calcium, Total Serum: 8.8 mg/dL (06-20-22 @ 08:21)      LFTs:             5.7  | 0.2  | 19       ------------------[150     ( 20 Jun 2022 08:21 )  3.0  | x    | 21          Lipase:x      Amylase:x        ACCESS/ DEVICES:  [x] Peripheral IV

## 2022-06-21 NOTE — PROGRESS NOTE ADULT - ASSESSMENT
· Assessment	  46 yo F with PMH gastric sleeve 5/18/22 at [facility] with Dr Clarke Mandel who presents with abd pain for a week . The patient was transferred from [facility] after CT scan showed fluid collection suspicious for contained leak. The patient originally presented here at [facility] and was transferred to [facility] then transferred back after ct scan findings.  Currently admitted to medicine with the primary diagnosis of abd fluid collection.    #Diffuse abdominal pain secondary to contained leak and/or abscess  #S/P gastric sleeve surgery 5/18/22 at [facility]  -hemodynamically stable ,afebrile ,  -transferred from [facility] for concern of contained leak  -sp gastric sleeve 5/18/22 at [facility] with Dr Clarke Mandel   -CT 6/13/22 Prior sleeve gastrectomy with irregularity of the suture line and   adjacent fluid collection measuring up to 6.2 cm. Findings suspicious for   contained leak and/or abscess formation.  - c/w Zofran and morphine IV PRN   - GI consulted, s/p EGD 6/19 -patient reports stents being placed?     EGD report noted.  >> Will advance dist as tolerated/ Bariatric Sx  - bariatric surgery following  - ID consulted, recs appreciated   - Nutrition: was on TPN. Since gastrografin did not show a leak, clear liquid diet started. If tolerated, will taper off TPN    #Mild COVID (detected 6/13), asymptomatic  - On room air  - continue to monitor    #Epistaxis - resolved  - cont. to monitor    #HTN   -on home losartan 100mg  - c/w losartan    #Progress Note Handoff  Pending (specify):  Diet Tolerance / TPN  Family discussion:  Disposition: Home_

## 2022-06-21 NOTE — PROGRESS NOTE ADULT - ATTENDING COMMENTS
48yo female COVID+ s/p sleeve gastrectomy 5/18/2022 [facility] complicated by staple line leak transferred to [facility] for endoscopic management s/p endoscopic clip placement over gastrotomy. Patient states she is feeling better after procedure. Denies fever/chills, nausea/vomiting, chest pain or shortness of breath. Tolerating CLD. Exam benign. Labs reviewed - WBC 5. Recommend continued CLD, discontinue TPN. F/U GI. Bariatric surgery to follow. COVID precautions.

## 2022-06-21 NOTE — PROGRESS NOTE ADULT - SUBJECTIVE AND OBJECTIVE BOX
Gastroenterology progress note:     Patient is a 47y old  Female who presents with a chief complaint of Abdominal pain and emesis (21 Jun 2022 06:43)       Admitted on: 06-16-22    We are following the patient for: lek at gastric sleeve site     Interval History:  patient can tolerate clear liquid diet, UGI series did not show leak      PAST MEDICAL & SURGICAL HISTORY:  Obesity      S/P gastric sleeve procedure          MEDICATIONS  (STANDING):  cefTRIAXone   IVPB 1000 milliGRAM(s) IV Intermittent every 24 hours  chlorhexidine 4% Liquid 1 Application(s) Topical <User Schedule>  enoxaparin Injectable 40 milliGRAM(s) SubCutaneous every 24 hours  fat emulsion (Fish Oil and Plant Based) 20% Infusion 1.002 Gm/kG/Day (41.7 mL/Hr) IV Continuous <Continuous>  losartan 100 milliGRAM(s) Oral daily  metroNIDAZOLE  IVPB      metroNIDAZOLE  IVPB 500 milliGRAM(s) IV Intermittent every 8 hours  ondansetron Injectable 4 milliGRAM(s) IV Push every 8 hours  pantoprazole  Injectable 40 milliGRAM(s) IV Push every 12 hours  Parenteral Nutrition - Adult 1 Each (65 mL/Hr) TPN Continuous <Continuous>  Parenteral Nutrition - Adult 1 Each (65 mL/Hr) TPN Continuous <Continuous>    MEDICATIONS  (PRN):  acetaminophen     Tablet .. 650 milliGRAM(s) Oral every 6 hours PRN Mild Pain (1 - 3)  HYDROmorphone  Injectable 0.5 milliGRAM(s) IV Push every 10 minutes PRN Moderate Pain (4 - 6)  HYDROmorphone  Injectable 1 milliGRAM(s) IV Push every 10 minutes PRN Severe Pain (7 - 10)  morphine  - Injectable 2 milliGRAM(s) IV Push every 6 hours PRN Severe Pain (7 - 10)      Allergies  No Known Allergies      Review of Systems:   Constitutional: Ne Fever, No chills, No weakness  ENT: No visual changes, No throat pain  Cardiovascular:  No Chest Pain, No Palpitations  Respiratory:  No Cough, No Dyspnea  Gastrointestinal:  As described in HPI  Neurological: No numbness or weakness  Skin: No rash, no itching    Physical Examination:  T(C): 37.1 (06-21-22 @ 08:25), Max: 37.1 (06-21-22 @ 08:25)  HR: 59 (06-21-22 @ 08:25) (59 - 81)  BP: 144/63 (06-21-22 @ 08:25) (130/67 - 149/73)  RR: 18 (06-21-22 @ 08:25) (18 - 18)  SpO2: 98% (06-21-22 @ 08:25) (98% - 98%)      06-20-22 @ 07:01  -  06-21-22 @ 07:00  --------------------------------------------------------  IN: 1447 mL / OUT: 0 mL / NET: 1447 mL      Constitutional: No acute distress.  Respiratory:  No signs of respiratory distress. Lung sounds are clear bilaterally.  Cardiovascular:  S1 S2, Regular rate and rhythm.  Abdominal: Abdomen is soft, symmetric, and non-tender without distention. There are no visible lesions. Bowel sounds are present and normoactive in all four quadrants. No masses, hepatomegaly, or splenomegaly are noted.   Skin: No rashes, No Jaundice.  Neurology: AAOX3, Non-focal  Skin: No rash, No excoriation  Vascular: No varicose vein, No cyanosis, No edema        Data: (reviewed by attending)                        8.7    5.58  )-----------( 143      ( 21 Jun 2022 07:30 )             27.0     Hgb trend:  8.7  06-21-22 @ 07:30  8.8  06-20-22 @ 08:21  8.5  06-19-22 @ 08:17        06-21    143  |  109  |  9<L>  ----------------------------<  141<H>  4.0   |  21  |  0.7    Ca    8.2<L>      21 Jun 2022 07:30  Phos  4.5     06-21  Mg     1.8     06-21    TPro  5.6<L>  /  Alb  3.0<L>  /  TBili  <0.2  /  DBili  x   /  AST  17  /  ALT  18  /  AlkPhos  128<H>  06-21    Liver panel trend:  TBili <0.2   /   AST 17   /   ALT 18   /   AlkP 128   /   Tptn 5.6   /   Alb 3.0    /   DBili --      06-21  TBili 0.2   /   AST 19   /   ALT 21   /   AlkP 150   /   Tptn 5.7   /   Alb 3.0    /   DBili --      06-20  TBili <0.2   /   AST 31   /   ALT 25   /   AlkP 165   /   Tptn 5.5   /   Alb 2.9    /   DBili --      06-19  TBili 0.2   /   AST 27   /   ALT 21   /   AlkP 163   /   Tptn 5.7   /   Alb 2.8    /   DBili --      06-18  TBili 0.3   /   AST 15   /   ALT 14   /   AlkP 185   /   Tptn 5.6   /   Alb 2.9    /   DBili --      06-17  TBili 1.2   /   AST 35   /   ALT 24   /   AlkP 206   /   Tptn 7.6   /   Alb 3.9    /   DBili --      06-13          < from: Xray Esophagram Single Contrast (06.20.22 @ 10:20) >  IMPRESSION:    Limited esophagram demonstrates no extraluminal contrast to suggest leak.    < end of copied text >

## 2022-06-21 NOTE — PROGRESS NOTE ADULT - ASSESSMENT
48 yo F with PMH gastric sleeve 5/18/22 at [facility] with Dr Clarke Mandel who presents with abd pain for a week . The patient was transferred from [facility] after CT scan showed fluid collection suspicious for contained leak. The patient originally presented here at [facility] and was transferred to [facility] then transferred back after ct scan findings.    Currently admitted to medicine with the primary diagnosis of abd fluid collection      #Diffuse abdominal pain secondary to contained leak and/or abscess  #S/P gastric sleeve surgery 5/18/22 at [facility]  -hemodynamically stable ,afebrile ,  -transferred from [facility] for concern of contained leak  -sp gastric sleeve 5/18/22 at [facility] with Dr Clarke Mandel   -CT 6/13/22 Prior sleeve gastrectomy with irregularity of the suture line and   adjacent fluid collection measuring up to 6.2 cm. Findings suspicious for   contained leak and/or abscess formation.  - c/w Zofran and morphine IV PRN     GI:  - s/p EGD 6/19 - no extraluminal contrast to suggest leak   -Plan as of today: c/w clear liquid diet  advance diet as per bariatric surgery protocol  finish the course of abx  IV PPI BID--> can switch to PO daily on discharge  Outpatient follow up with bariatric surgery  Please recall GI for any questions or concerns    Bariatric surgery:  -Management per primary team   - See if tolerating diet  - Ween TPN  -Pain control   -Continue PPI    ID: ceftriaxone 1g q24, flagyl 500mg q8h. On d/c PO levaquin 500mg qd and flagyl 500mg tid [id number]      -DVT ppx    - Nutrition: was on TPN. Since gastrografin did not show a leak, clear liquid diet started. If tolerated, will taper off TPN.        #Mild COVID (detected 6/13), asymptomatic  - On room air  - continue to monitor    #Epistaxis - resolved  - cont. to monitor    #HTN   -on home losartan 100mg

## 2022-06-21 NOTE — PROGRESS NOTE ADULT - SUBJECTIVE AND OBJECTIVE BOX
ZENON MEADOWS 47y Female  MRN#: [id number]     Hospital Day: 5d    Pt is currently admitted with the primary diagnosis of  POST SURGERY LEAK        SUBJECTIVE     Overnight events  None    Subjective complaints  Pt was evaluated this am. Patient reports no complaints. She denies any pain/ nausea/ vomiting                                              ----------------------------------------------------------  OBJECTIVE  PAST MEDICAL & SURGICAL HISTORY  Obesity    S/P gastric sleeve procedure                                              -----------------------------------------------------------  ALLERGIES:  No Known Allergies                                            ------------------------------------------------------------    HOME MEDICATIONS  Home Medications:  hydroCHLOROthiazide 12.5 mg oral capsule: 1 cap(s) orally once a day (16 Jun 2022 23:25)  losartan 100 mg oral tablet: 1 tab(s) orally once a day (16 Jun 2022 23:25)  Vitamin B-12 100 mcg oral tablet: 1 tab(s) orally once a day (16 Jun 2022 23:26)                           MEDICATIONS:  STANDING MEDICATIONS  cefTRIAXone   IVPB 1000 milliGRAM(s) IV Intermittent every 24 hours  chlorhexidine 4% Liquid 1 Application(s) Topical <User Schedule>  enoxaparin Injectable 40 milliGRAM(s) SubCutaneous every 24 hours  fat emulsion (Fish Oil and Plant Based) 20% Infusion 1.002 Gm/kG/Day IV Continuous <Continuous>  losartan 100 milliGRAM(s) Oral daily  metroNIDAZOLE  IVPB      metroNIDAZOLE  IVPB 500 milliGRAM(s) IV Intermittent every 8 hours  ondansetron Injectable 4 milliGRAM(s) IV Push every 8 hours  pantoprazole  Injectable 40 milliGRAM(s) IV Push every 12 hours  Parenteral Nutrition - Adult 1 Each TPN Continuous <Continuous>  Parenteral Nutrition - Adult 1 Each TPN Continuous <Continuous>    PRN MEDICATIONS  acetaminophen     Tablet .. 650 milliGRAM(s) Oral every 6 hours PRN  HYDROmorphone  Injectable 0.5 milliGRAM(s) IV Push every 10 minutes PRN  HYDROmorphone  Injectable 1 milliGRAM(s) IV Push every 10 minutes PRN  morphine  - Injectable 2 milliGRAM(s) IV Push every 6 hours PRN                                            ------------------------------------------------------------  VITAL SIGNS: Last 24 Hours  T(C): 37.1 (21 Jun 2022 08:25), Max: 37.1 (21 Jun 2022 08:25)  T(F): 98.7 (21 Jun 2022 08:25), Max: 98.7 (21 Jun 2022 08:25)  HR: 59 (21 Jun 2022 08:25) (59 - 81)  BP: 144/63 (21 Jun 2022 08:25) (130/67 - 149/73)  BP(mean): --  RR: 18 (21 Jun 2022 08:25) (18 - 18)  SpO2: 98% (21 Jun 2022 08:25) (98% - 98%)      06-20-22 @ 07:01  -  06-21-22 @ 07:00  --------------------------------------------------------  IN: 1447 mL / OUT: 0 mL / NET: 1447 mL                                             --------------------------------------------------------------  LABS:                        8.7    5.58  )-----------( 143      ( 21 Jun 2022 07:30 )             27.0     06-21    143  |  109  |  9<L>  ----------------------------<  141<H>  4.0   |  21  |  0.7    Ca    8.2<L>      21 Jun 2022 07:30  Phos  4.5     06-21  Mg     1.8     06-21    TPro  5.6<L>  /  Alb  3.0<L>  /  TBili  <0.2  /  DBili  x   /  AST  17  /  ALT  18  /  AlkPhos  128<H>  06-21                                            --------------------------------------------------------------    PHYSICAL EXAM:  GENERAL: NAD, lying in bed comfortably  HEAD:  Atraumatic, Normocephalic  EYES: EOMI, conjunctiva and sclera clear  ENT: Moist mucous membranes  NECK: Supple, No JVD  CHEST/LUNG: Clear to auscultation bilaterally; No rales, rhonchi, wheezing, or rubs. Unlabored respirations  HEART: regular rate and rhythm; No murmurs, rubs, or gallops  ABDOMEN: Bowel sounds present; Soft, Nontender, Nondistended.    EXTREMITIES: Warm. No clubbing, cyanosis, or edema  NERVOUS SYSTEM:  Alert & Oriented X3.                                            --------------------------------------------------------------

## 2022-06-21 NOTE — PROGRESS NOTE ADULT - ASSESSMENT
ASSESSMENT:  47y F s/p sleeve gastrectomy complicated by a leak (5/18 Dr Tequila GLEZ) transferred to [facility] for endoscopic mgmt.    PLAN:  -Management per primary team   -Pain control   -Continue PPI  -ID: ceftriaxone 1g q24, flagyl 500mg q8h. On d/c PO levaquin 500mg qd and flagyl 500mg tid [id number]    -DVT ppx    LINES: PIV    BLUE TEAM SPECTRA: [id number]   ASSESSMENT:  47y F s/p sleeve gastrectomy complicated by a leak (5/18 Dr Tequila GLEZ) transferred to [facility] for endoscopic mgmt.    PLAN:  -Management per primary team   - See if tolerating diet  - Ween TPN  -Pain control   -Continue PPI  -ID: ceftriaxone 1g q24, flagyl 500mg q8h. On d/c PO levaquin 500mg qd and flagyl 500mg tid [id number]    -DVT ppx    LINES: PIV    BLUE TEAM SPECTRA: [id number]

## 2022-06-21 NOTE — PROGRESS NOTE ADULT - SUBJECTIVE AND OBJECTIVE BOX
ZENON MEADOWS  47y  Female      Patient is a 47y old  Female who presents with a chief complaint of Abdominal pain and emesis.       INTERVAL HPI/OVERNIGHT EVENTS:      ******************************* REVIEW OF SYSTEMS:**********************************************    All other review of systems negative    *********************** VITALS ******************************************    T(F): 98.7 (06-21-22 @ 08:25)  HR: 59 (06-21-22 @ 08:25) (59 - 81)  BP: 144/63 (06-21-22 @ 08:25) (130/67 - 149/73)  RR: 18 (06-21-22 @ 08:25) (18 - 18)  SpO2: 98% (06-21-22 @ 08:25) (98% - 98%)    06-20-22 @ 07:01  -  06-21-22 @ 07:00  --------------------------------------------------------  IN: 1447 mL / OUT: 0 mL / NET: 1447 mL            06-20-22 @ 07:01  -  06-21-22 @ 07:00  --------------------------------------------------------  IN: 1447 mL / OUT: 0 mL / NET: 1447 mL        ******************************** PHYSICAL EXAM:**************************************************  GENERAL: NAD    PSYCH: no agitation, baseline mentation  HEENT:     NERVOUS SYSTEM:  Alert & Oriented X3,     PULMONARY: NADIRA, CTA    CARDIOVASCULAR: S1S2 RRR    GI: Soft, NT, ND; BS present.    EXTREMITIES:  2+ Peripheral Pulses, No clubbing, cyanosis, or edema    LYMPH: No lymphadenopathy noted    SKIN: No rashes or lesions      **************************** LABS *******************************************************                          8.7    5.58  )-----------( 143      ( 21 Jun 2022 07:30 )             27.0     06-21    143  |  109  |  9<L>  ----------------------------<  141<H>  4.0   |  21  |  0.7    Ca    8.2<L>      21 Jun 2022 07:30  Phos  4.5     06-21  Mg     1.8     06-21    TPro  5.6<L>  /  Alb  3.0<L>  /  TBili  <0.2  /  DBili  x   /  AST  17  /  ALT  18  /  AlkPhos  128<H>  06-21          Lactate Trend        CAPILLARY BLOOD GLUCOSE              **************************Active Medications *******************************************  No Known Allergies      acetaminophen     Tablet .. 650 milliGRAM(s) Oral every 6 hours PRN  cefTRIAXone   IVPB 1000 milliGRAM(s) IV Intermittent every 24 hours  chlorhexidine 4% Liquid 1 Application(s) Topical <User Schedule>  enoxaparin Injectable 40 milliGRAM(s) SubCutaneous every 24 hours  fat emulsion (Fish Oil and Plant Based) 20% Infusion 1.002 Gm/kG/Day IV Continuous <Continuous>  HYDROmorphone  Injectable 0.5 milliGRAM(s) IV Push every 10 minutes PRN  HYDROmorphone  Injectable 1 milliGRAM(s) IV Push every 10 minutes PRN  losartan 100 milliGRAM(s) Oral daily  metroNIDAZOLE  IVPB      metroNIDAZOLE  IVPB 500 milliGRAM(s) IV Intermittent every 8 hours  morphine  - Injectable 2 milliGRAM(s) IV Push every 6 hours PRN  ondansetron Injectable 4 milliGRAM(s) IV Push every 8 hours  pantoprazole  Injectable 40 milliGRAM(s) IV Push every 12 hours  Parenteral Nutrition - Adult 1 Each TPN Continuous <Continuous>  Parenteral Nutrition - Adult 1 Each TPN Continuous <Continuous>      ***************************************************  RADIOLOGY & ADDITIONAL TESTS:    Imaging Personally Reviewed:  [ ] YES  [ ] NO    HEALTH ISSUES - PROBLEM Dx:

## 2022-06-21 NOTE — PROGRESS NOTE ADULT - ASSESSMENT
48 yo F with w/ hx of obesity s/p gastric sleeve on 22 at [facility] with Dr Clarke Mandel, being evaluated for concern of leak at the anastomotic site.     #leak s/p gastric sleeve   #fluid collection on CT  EGD report:  Normal duodenum.  	Grade A esophagitis in the lower third of the esophagus compatible with nonspecific erosive esophagitis.  	Previous Surgery in the gastric body and fundus.  	In the proximal stomach around 40 cm from the incisors, there was an area of erythema and edema. A 5mm full thickness defect was noted between the folds that was consistent with the suspected site of leak, and pus and purulent fluid was noted to be draining out of the opening and likely coming from the collection seen on CT.  	The endoscope was then exchanged to a  scope and the collection was evaluated and noted to have pus that was washed and suctioned. The scope was exchanged to standard endoscope loaded with a 12mm OVESCO clip that was successfully deployed to close the defect.  	Finally, using a double channel scope, a biliary balloon was advanced in the stomach and inflated to occlude the lumen. A canula was advanced through the other channel and contrast was injected above the balloon in the area of the prior leak. Under fluoroscopy, no leak was noted.    Pt denies any abdominal pain, nausea, vomiting, chest pain, abdominal pain  Hemodynamically stable   UGI series on  did not show any leak  patient can tolerate clear liquid diet    Plan:	  c/w clear liquid diet  advance diet as per bariatric surgery protocol  finish the course of abx  IV PPI BID--> can switch to PO daily on discharge  Outpatient follow up with bariatric surgery  Please recall GI for any questions or concerns

## 2022-06-22 LAB
ALBUMIN SERPL ELPH-MCNC: 3.1 G/DL — LOW (ref 3.5–5.2)
ALP SERPL-CCNC: 110 U/L — SIGNIFICANT CHANGE UP (ref 30–115)
ALT FLD-CCNC: 14 U/L — SIGNIFICANT CHANGE UP (ref 0–41)
ANION GAP SERPL CALC-SCNC: 9 MMOL/L — SIGNIFICANT CHANGE UP (ref 7–14)
AST SERPL-CCNC: 11 U/L — SIGNIFICANT CHANGE UP (ref 0–41)
BASOPHILS # BLD AUTO: 0.03 K/UL — SIGNIFICANT CHANGE UP (ref 0–0.2)
BASOPHILS NFR BLD AUTO: 0.5 % — SIGNIFICANT CHANGE UP (ref 0–1)
BILIRUB SERPL-MCNC: <0.2 MG/DL — SIGNIFICANT CHANGE UP (ref 0.2–1.2)
BUN SERPL-MCNC: 11 MG/DL — SIGNIFICANT CHANGE UP (ref 10–20)
CALCIUM SERPL-MCNC: 8.4 MG/DL — LOW (ref 8.5–10.1)
CHLORIDE SERPL-SCNC: 104 MMOL/L — SIGNIFICANT CHANGE UP (ref 98–110)
CO2 SERPL-SCNC: 23 MMOL/L — SIGNIFICANT CHANGE UP (ref 17–32)
CREAT SERPL-MCNC: 0.6 MG/DL — LOW (ref 0.7–1.5)
EGFR: 112 ML/MIN/1.73M2 — SIGNIFICANT CHANGE UP
EOSINOPHIL # BLD AUTO: 0.21 K/UL — SIGNIFICANT CHANGE UP (ref 0–0.7)
EOSINOPHIL NFR BLD AUTO: 3.3 % — SIGNIFICANT CHANGE UP (ref 0–8)
GLUCOSE SERPL-MCNC: 127 MG/DL — HIGH (ref 70–99)
HCT VFR BLD CALC: 27.8 % — LOW (ref 37–47)
HGB BLD-MCNC: 8.8 G/DL — LOW (ref 12–16)
IMM GRANULOCYTES NFR BLD AUTO: 1.7 % — HIGH (ref 0.1–0.3)
LYMPHOCYTES # BLD AUTO: 1.46 K/UL — SIGNIFICANT CHANGE UP (ref 1.2–3.4)
LYMPHOCYTES # BLD AUTO: 23.2 % — SIGNIFICANT CHANGE UP (ref 20.5–51.1)
MAGNESIUM SERPL-MCNC: 2.2 MG/DL — SIGNIFICANT CHANGE UP (ref 1.8–2.4)
MCHC RBC-ENTMCNC: 28.1 PG — SIGNIFICANT CHANGE UP (ref 27–31)
MCHC RBC-ENTMCNC: 31.7 G/DL — LOW (ref 32–37)
MCV RBC AUTO: 88.8 FL — SIGNIFICANT CHANGE UP (ref 81–99)
MONOCYTES # BLD AUTO: 0.45 K/UL — SIGNIFICANT CHANGE UP (ref 0.1–0.6)
MONOCYTES NFR BLD AUTO: 7.1 % — SIGNIFICANT CHANGE UP (ref 1.7–9.3)
NEUTROPHILS # BLD AUTO: 4.04 K/UL — SIGNIFICANT CHANGE UP (ref 1.4–6.5)
NEUTROPHILS NFR BLD AUTO: 64.2 % — SIGNIFICANT CHANGE UP (ref 42.2–75.2)
NRBC # BLD: 0 /100 WBCS — SIGNIFICANT CHANGE UP (ref 0–0)
PHOSPHATE SERPL-MCNC: 4.2 MG/DL — SIGNIFICANT CHANGE UP (ref 2.1–4.9)
PLATELET # BLD AUTO: 142 K/UL — SIGNIFICANT CHANGE UP (ref 130–400)
POTASSIUM SERPL-MCNC: 4 MMOL/L — SIGNIFICANT CHANGE UP (ref 3.5–5)
POTASSIUM SERPL-SCNC: 4 MMOL/L — SIGNIFICANT CHANGE UP (ref 3.5–5)
PROT SERPL-MCNC: 5.5 G/DL — LOW (ref 6–8)
RBC # BLD: 3.13 M/UL — LOW (ref 4.2–5.4)
RBC # FLD: 14.6 % — HIGH (ref 11.5–14.5)
SODIUM SERPL-SCNC: 136 MMOL/L — SIGNIFICANT CHANGE UP (ref 135–146)
WBC # BLD: 6.3 K/UL — SIGNIFICANT CHANGE UP (ref 4.8–10.8)
WBC # FLD AUTO: 6.3 K/UL — SIGNIFICANT CHANGE UP (ref 4.8–10.8)

## 2022-06-22 PROCEDURE — 99232 SBSQ HOSP IP/OBS MODERATE 35: CPT

## 2022-06-22 RX ORDER — CIPROFLOXACIN LACTATE 400MG/40ML
1 VIAL (ML) INTRAVENOUS
Qty: 10 | Refills: 0
Start: 2022-06-22 | End: 2022-07-01

## 2022-06-22 RX ORDER — METRONIDAZOLE 500 MG
1 TABLET ORAL
Qty: 30 | Refills: 0
Start: 2022-06-22 | End: 2022-07-01

## 2022-06-22 RX ORDER — ELECTROLYTE SOLUTION,INJ
1 VIAL (ML) INTRAVENOUS
Refills: 0 | Status: DISCONTINUED | OUTPATIENT
Start: 2022-06-22 | End: 2022-06-22

## 2022-06-22 RX ADMIN — CHLORHEXIDINE GLUCONATE 1 APPLICATION(S): 213 SOLUTION TOPICAL at 05:29

## 2022-06-22 RX ADMIN — Medication 100 MILLIGRAM(S): at 13:46

## 2022-06-22 RX ADMIN — PANTOPRAZOLE SODIUM 40 MILLIGRAM(S): 20 TABLET, DELAYED RELEASE ORAL at 05:29

## 2022-06-22 RX ADMIN — LOSARTAN POTASSIUM 100 MILLIGRAM(S): 100 TABLET, FILM COATED ORAL at 05:29

## 2022-06-22 RX ADMIN — ONDANSETRON 4 MILLIGRAM(S): 8 TABLET, FILM COATED ORAL at 13:47

## 2022-06-22 RX ADMIN — Medication 100 MILLIGRAM(S): at 22:52

## 2022-06-22 RX ADMIN — CEFTRIAXONE 100 MILLIGRAM(S): 500 INJECTION, POWDER, FOR SOLUTION INTRAMUSCULAR; INTRAVENOUS at 06:42

## 2022-06-22 RX ADMIN — Medication 100 MILLIGRAM(S): at 05:29

## 2022-06-22 RX ADMIN — ENOXAPARIN SODIUM 40 MILLIGRAM(S): 100 INJECTION SUBCUTANEOUS at 22:52

## 2022-06-22 RX ADMIN — ONDANSETRON 4 MILLIGRAM(S): 8 TABLET, FILM COATED ORAL at 05:29

## 2022-06-22 RX ADMIN — Medication 1 EACH: at 22:53

## 2022-06-22 RX ADMIN — HYDROMORPHONE HYDROCHLORIDE 1 MILLIGRAM(S): 2 INJECTION INTRAMUSCULAR; INTRAVENOUS; SUBCUTANEOUS at 03:12

## 2022-06-22 RX ADMIN — ONDANSETRON 4 MILLIGRAM(S): 8 TABLET, FILM COATED ORAL at 22:52

## 2022-06-22 RX ADMIN — PANTOPRAZOLE SODIUM 40 MILLIGRAM(S): 20 TABLET, DELAYED RELEASE ORAL at 17:08

## 2022-06-22 NOTE — PROGRESS NOTE ADULT - SUBJECTIVE AND OBJECTIVE BOX
SUBJECTIVE:    Patient is a 47y old Female who presents with a chief complaint of Abdominal pain and emesis (22 Jun 2022 10:24)    Currently admitted to medicine with the primary diagnosis of    Today is hospital day 6d.     PAST MEDICAL & SURGICAL HISTORY  Obesity    S/P gastric sleeve procedure      ALLERGIES:  No Known Allergies    MEDICATIONS:  STANDING MEDICATIONS  cefTRIAXone   IVPB 1000 milliGRAM(s) IV Intermittent every 24 hours  chlorhexidine 4% Liquid 1 Application(s) Topical <User Schedule>  enoxaparin Injectable 40 milliGRAM(s) SubCutaneous every 24 hours  losartan 100 milliGRAM(s) Oral daily  metroNIDAZOLE  IVPB 500 milliGRAM(s) IV Intermittent every 8 hours  metroNIDAZOLE  IVPB      ondansetron Injectable 4 milliGRAM(s) IV Push every 8 hours  pantoprazole  Injectable 40 milliGRAM(s) IV Push every 12 hours  Parenteral Nutrition - Adult 1 Each TPN Continuous <Continuous>    PRN MEDICATIONS  acetaminophen     Tablet .. 650 milliGRAM(s) Oral every 6 hours PRN  HYDROmorphone  Injectable 0.5 milliGRAM(s) IV Push every 10 minutes PRN  morphine  - Injectable 2 milliGRAM(s) IV Push every 6 hours PRN    VITALS:   T(F): 97.7  HR: 59  BP: 143/81  RR: 18  SpO2: 99%    LABS:                        8.8    6.30  )-----------( 142      ( 22 Jun 2022 07:29 )             27.8     06-22    136  |  104  |  11  ----------------------------<  127<H>  4.0   |  23  |  0.6<L>    Ca    8.4<L>      22 Jun 2022 07:29  Phos  4.2     06-22  Mg     2.2     06-22    TPro  5.5<L>  /  Alb  3.1<L>  /  TBili  <0.2  /  DBili  x   /  AST  11  /  ALT  14  /  AlkPhos  110  06-22                  RADIOLOGY:    PHYSICAL EXAM:  GEN: No acute distress  LUNGS: Clear to auscultation bilaterally   HEART: S1/S2 present. RRR.   ABD/ GI: Soft, non-tender, non-distended. Bowel sounds present  EXT: NC/NC/NE/2+PP/BAKER  NEURO: AAOX3

## 2022-06-22 NOTE — PROGRESS NOTE ADULT - ASSESSMENT
46 yo F with PMH gastric sleeve 22 at [facility] with Dr Clarke Mandel who presents with abd pain for a week . The patient was transferred from [facility] after CT scan showed fluid collection suspicious for contained leak. The patient originally presented here at [facility] and was transferred to [facility] then transferred back after ct scan findings.  Currently admitted to medicine with the primary diagnosis of abd fluid collection.    #Diffuse abdominal pain secondary to contained leak and/or abscess  #S/P gastric sleeve surgery 22 at [facility]  --transferred from [facility] for concern of contained leak  -sp gastric sleeve 22 at [facility] with Dr Clarke Mandel   -CT 22 Prior sleeve gastrectomy with irregularity of the suture line and   adjacent fluid collection measuring up to 6.2 cm. Findings suspicious for   contained leak and/or abscess formation.  - c/w Zofran and morphine IV PRN   - GI consulted, s/p EGD  < from: EGD (22 @ 15:30) >  A 5mm full thickness defect was noted between the folds that  was consistent with the suspected site of leak, and pus and purulent fluid was  noted to be draining out of the opening and likely coming from the collection  seen on CT.    The endoscope was then exchanged to a  scope and the collection was  evaluated and noted to have pus that was washed and suctioned. The scope was  exchanged to standard endoscope loaded with a 12mm OVESCO clip that was  successfully deployed to close the defect.    <- ID consulted, recs appreciated   - Nutrition: was on TPN. Since gastrografin did not show a leak, clear liquid diet started.now tolerating it well.    #Mild COVID (detected ), asymptomatic  - On room air  - continue to monitor    #Epistaxis - resolved  - cont. to monitor    #HTN   -on home losartan 100mg  - c/w losartan    Dc home today--spent more than 30mins.   48 yo F with PMH gastric sleeve 22 at [facility] with Dr Clarke Mandel who presents with abd pain for a week . The patient was transferred from [facility] after CT scan showed fluid collection suspicious for contained leak. The patient originally presented here at [facility] and was transferred to [facility] then transferred back after ct scan findings.  Currently admitted to medicine with the primary diagnosis of abd fluid collection.    #Diffuse abdominal pain secondary to contained leak and/or abscess  #S/P gastric sleeve surgery 22 at [facility]  --transferred from [facility] for concern of contained leak  -sp gastric sleeve 22 at [facility] with Dr Clarke Mandel   -CT 22 Prior sleeve gastrectomy with irregularity of the suture line and   adjacent fluid collection measuring up to 6.2 cm. Findings suspicious for   contained leak and/or abscess formation.  - c/w Zofran and morphine IV PRN   - GI consulted, s/p EGD  < from: EGD (22 @ 15:30) >  A 5mm full thickness defect was noted between the folds that  was consistent with the suspected site of leak, and pus and purulent fluid was  noted to be draining out of the opening and likely coming from the collection  seen on CT.    The endoscope was then exchanged to a  scope and the collection was  evaluated and noted to have pus that was washed and suctioned. The scope was  exchanged to standard endoscope loaded with a 12mm OVESCO clip that was  successfully deployed to close the defect.    <- ID consulted, recs appreciated   - Nutrition: was on TPN. Since gastrografin did not show a leak, clear liquid diet started.now tolerating it well. po levaquin and flagyl for 14 days    #Mild COVID (detected ), asymptomatic  - On room air  - continue to monitor    #Epistaxis - resolved  - cont. to monitor    #HTN   -on home losartan 100mg  - c/w losartan    Dc home today--spent more than 30mins.   46 yo F with PMH gastric sleeve 22 at [facility] with Dr Clarke Mandel who presents with abd pain for a week . The patient was transferred from [facility] after CT scan showed fluid collection suspicious for contained leak. The patient originally presented here at [facility] and was transferred to [facility] then transferred back after ct scan findings.  Currently admitted to medicine with the primary diagnosis of abd fluid collection.    #Diffuse abdominal pain secondary to contained leak and/or abscess  #S/P gastric sleeve surgery 22 at [facility]  --transferred from [facility] for concern of contained leak  -sp gastric sleeve 22 at [facility] with Dr Clarke Mandel   -CT 22 Prior sleeve gastrectomy with irregularity of the suture line and   adjacent fluid collection measuring up to 6.2 cm. Findings suspicious for   contained leak and/or abscess formation.  - c/w Zofran and morphine IV PRN   - GI consulted, s/p EGD  < from: EGD (22 @ 15:30) >  A 5mm full thickness defect was noted between the folds that  was consistent with the suspected site of leak, and pus and purulent fluid was  noted to be draining out of the opening and likely coming from the collection  seen on CT.    The endoscope was then exchanged to a  scope and the collection was  evaluated and noted to have pus that was washed and suctioned. The scope was  exchanged to standard endoscope loaded with a 12mm OVESCO clip that was  successfully deployed to close the defect.    <- ID consulted, recs appreciated   - Nutrition: was on TPN. Since gastrografin did not show a leak, clear liquid diet started.now tolerating it well. po levaquin and flagyl for 14 days    #Mild COVID (detected ), asymptomatic  - On room air  - continue to monitor    #Epistaxis - resolved  - cont. to monitor    #HTN   -on home losartan 100mg  - c/w losartan    Dc home today--spent more than 30mins.cancelled As nutrition to taper off TPN

## 2022-06-22 NOTE — PROGRESS NOTE ADULT - ASSESSMENT
IMP:  - s/p gastric sleeve 5/18 at [facility]  - contained leak vs abscess --- see results of EGD noted above  - covid +  - morbid obesity    PLAN  - cont TPN tonight - will decrease both rate and caloric content  - advance to bariatric full liquids and add Ensure Solomon twice today  - if tolerated and volumes ingested adequate, will taper off TPN tomorrow morning (remember -its' TPN not PPN)  - d/w both med and surg residents

## 2022-06-22 NOTE — PROGRESS NOTE ADULT - ASSESSMENT
46 yo F with PMH gastric sleeve 5/18/22 at [facility] with Dr Clarke Mandel who presents with abd pain for a week . The patient was transferred from [facility] after CT scan showed fluid collection suspicious for contained leak. The patient originally presented here at [facility] and was transferred to [facility] then transferred back after ct scan findings.    Currently admitted to medicine with the primary diagnosis of abd fluid collection. Started on flagyl and ceftriaxone  -hemodynamically stable ,afebrile ,    GI:  - s/p EGD 6/19 - no extraluminal contrast to suggest leak .   - Gastrograffin study 6/20 - no leak    Plan: advance diet as tolerated according to Bariatric .   -wean TPN according to nutrition   -IV PPI BID--> can switch to PO daily on discharge  Outpatient follow up with bariatric surgery  Please recall GI for any questions or concerns    Bariatric surgery:    - if patient is tolerating full liquid diet, can be discharged  -Pain control       ID: ceftriaxone 1g q24, flagyl 500mg q8h. On d/c PO levaquin 500mg qd and flagyl 500mg tid [id number]      -DVT ppx    - Nutrition: Patient will try Ensuremax for lunch today. If she tolerated it well and was able to get enough nutrition from that, will taper off TPN tonight.         #Mild COVID (detected 6/13), asymptomatic  - On room air  - continue to monitor    #Epistaxis - resolved  - cont. to monitor    #HTN   -on home losartan 100mg

## 2022-06-22 NOTE — PROGRESS NOTE ADULT - ATTENDING COMMENTS
48yo female COVID+ s/p sleeve gastrectomy [facility]/18/[facility] complicated by staple line leak transferred to [facility] for endoscopic management. Patient complains of abdominal pain, nausea and vomiting, unsure of fevers. No chest pain or shortness of breath. Labs reviewed. Recommend continued CLD, wean TPN, serial abdominal exams, monitor vital signs and labs for sepsis. F/U GI. Bariatric surgery to follow. COVID precautions. 46yo female COVID+ s/p sleeve gastrectomy 5/18/2022 at [facility] complicated by staple line leak transferred to [facility] for endoscopic management s/p endoscopic clip placement of gastrotomy. Patient states abdominal pain and nausea have completely resolved. Tolerating CLD. Labs reviewed. Recommend continued bariatric CLD, wean TPN, serial abdominal exams, monitor vital signs and labs for sepsis. F/U GI. Bariatric surgery signing off. COVID precautions. Primary care as pre primary team.

## 2022-06-22 NOTE — PROGRESS NOTE ADULT - SUBJECTIVE AND OBJECTIVE BOX
ZENON MEADOWS 47y Female  MRN#: [id number]     Hospital Day: 6d    Pt is currently admitted with the primary diagnosis of  POST SURGERY LEAK        SUBJECTIVE     Overnight events  None    Subjective complaints  Pt was evaluated this am. Patient denied any abdominal pain/ nausea/ vomiting. She tolerated clear liquid diet yesterday .                                          ----------------------------------------------------------  OBJECTIVE  PAST MEDICAL & SURGICAL HISTORY  Obesity    S/P gastric sleeve procedure                                              -----------------------------------------------------------  ALLERGIES:  No Known Allergies                                            ------------------------------------------------------------    HOME MEDICATIONS  Home Medications:  hydroCHLOROthiazide 12.5 mg oral capsule: 1 cap(s) orally once a day (16 Jun 2022 23:25)  losartan 100 mg oral tablet: 1 tab(s) orally once a day (16 Jun 2022 23:25)  Vitamin B-12 100 mcg oral tablet: 1 tab(s) orally once a day (16 Jun 2022 23:26)                           MEDICATIONS:  STANDING MEDICATIONS  cefTRIAXone   IVPB 1000 milliGRAM(s) IV Intermittent every 24 hours  chlorhexidine 4% Liquid 1 Application(s) Topical <User Schedule>  enoxaparin Injectable 40 milliGRAM(s) SubCutaneous every 24 hours  losartan 100 milliGRAM(s) Oral daily  metroNIDAZOLE  IVPB 500 milliGRAM(s) IV Intermittent every 8 hours  metroNIDAZOLE  IVPB      ondansetron Injectable 4 milliGRAM(s) IV Push every 8 hours  pantoprazole  Injectable 40 milliGRAM(s) IV Push every 12 hours  Parenteral Nutrition - Adult 1 Each TPN Continuous <Continuous>    PRN MEDICATIONS  acetaminophen     Tablet .. 650 milliGRAM(s) Oral every 6 hours PRN  HYDROmorphone  Injectable 0.5 milliGRAM(s) IV Push every 10 minutes PRN  morphine  - Injectable 2 milliGRAM(s) IV Push every 6 hours PRN                                            ------------------------------------------------------------  VITAL SIGNS: Last 24 Hours  T(C): 36.5 (22 Jun 2022 07:22), Max: 37.1 (21 Jun 2022 15:31)  T(F): 97.7 (22 Jun 2022 07:22), Max: 98.8 (21 Jun 2022 15:31)  HR: 59 (22 Jun 2022 07:22) (59 - 73)  BP: 143/81 (22 Jun 2022 07:22) (127/69 - 143/81)  BP(mean): --  RR: 18 (22 Jun 2022 07:22) (18 - 18)  SpO2: 99% (21 Jun 2022 23:56) (99% - 99%)      06-21-22 @ 07:01  -  06-22-22 @ 07:00  --------------------------------------------------------  IN: 850 mL / OUT: 0 mL / NET: 850 mL                                             --------------------------------------------------------------  LABS:                        8.8    6.30  )-----------( 142      ( 22 Jun 2022 07:29 )             27.8     06-22    136  |  104  |  11  ----------------------------<  127<H>  4.0   |  23  |  0.6<L>    Ca    8.4<L>      22 Jun 2022 07:29  Phos  4.2     06-22  Mg     2.2     06-22    TPro  5.5<L>  /  Alb  3.1<L>  /  TBili  <0.2  /  DBili  x   /  AST  11  /  ALT  14  /  AlkPhos  110  06-22                                                    --------------------------------------------------------------    PHYSICAL EXAM:  GENERAL: NAD, lying in bed comfortably  HEAD:  Atraumatic, Normocephalic  EYES: EOMI, conjunctiva and sclera clear  ENT: Moist mucous membranes  NECK: Supple, No JVD  CHEST/LUNG: Clear to auscultation bilaterally; No rales, rhonchi, wheezing, or rubs. Unlabored respirations  HEART: regular rate and rhythm; No murmurs, rubs, or gallops  ABDOMEN: Bowel sounds present; Soft, Nontender, Nondistended.    EXTREMITIES: Warm. No clubbing, cyanosis, or edema  NERVOUS SYSTEM:  Alert & Oriented X3                                           --------------------------------------------------------------

## 2022-06-22 NOTE — PROGRESS NOTE ADULT - ASSESSMENT
ASSESSMENT:  47y F s/p sleeve gastrectomy complicated by a leak (5/18 Dr Tequila GLEZ) transferred to [facility] for endoscopic mgmt.    PLAN:  - Management per primary team  - Continue to ween TPN  - Patient is tolerating CLD  - Pain Management  - Strict Ins and Out  - K>4, MG>2, Phos>3    BLUE TEAM SPECTRA: [id number]

## 2022-06-22 NOTE — PROGRESS NOTE ADULT - SUBJECTIVE AND OBJECTIVE BOX
GENERAL SURGERY PROGRESS NOTE    Patient: ZENON MEADOWS , 47y ([birth year])Female   MRN: [id number]  Location: [address] 028 A  Visit: 06-16-22 Inpatient  Date: 06-22-22 @ 00:14    Hospital Day #: 6  Post-Op Day #: None    Procedure/Dx/Injuries: 47y F s/p sleeve gastrectomy complicated by a leak (5/18 Dr Tequila GLEZ) transferred to [facility] for endoscopic mgmt.    Events of past 24 hours: Patient seen and examined at bedside, HD stable. Tolerating diet.    PAST MEDICAL & SURGICAL HISTORY:  Obesity      S/P gastric sleeve procedure          Vitals:   T(F): 97.2 (06-21-22 @ 23:56), Max: 98.8 (06-21-22 @ 15:31)  HR: 64 (06-21-22 @ 23:56)  BP: 137/79 (06-21-22 @ 23:56)  RR: 18 (06-21-22 @ 23:56)  SpO2: 99% (06-21-22 @ 23:56)      Diet, Clear Liquid:   Bariatric Clear Liquid (BARICLLIQ)      Fluids:     I & O's:    06-20-22 @ 07:01  -  06-21-22 @ 07:00  --------------------------------------------------------  IN:    Fat Emulsion (Fish Oil &amp; Plant Based) 20% Infusion: 417 mL    IV PiggyBack: 250 mL    PPN (Peripheral Parenteral Nutrition): 780 mL  Total IN: 1447 mL    OUT:  Total OUT: 0 mL    Total NET: 1447 mL        Bowel Movement : [x] YES [] NO  Flatus : [x] YES [] NO    PHYSICAL EXAM:  General: NAD, AAOx3, calm and cooperative  HEENT: EOMI, Trachea ML, Neck supple  Cardiac: RRR  Respiratory: normal respiratory effort, breath sounds equal BL, no wheeze, rhonchi or crackles  Abdomen: Soft, non-distended, non-tender, no rebound, no guarding   Vascular: Pulses 2+ throughout, extremities well perfused    MEDICATIONS  (STANDING):  cefTRIAXone   IVPB 1000 milliGRAM(s) IV Intermittent every 24 hours  chlorhexidine 4% Liquid 1 Application(s) Topical <User Schedule>  enoxaparin Injectable 40 milliGRAM(s) SubCutaneous every 24 hours  losartan 100 milliGRAM(s) Oral daily  metroNIDAZOLE  IVPB 500 milliGRAM(s) IV Intermittent every 8 hours  metroNIDAZOLE  IVPB      ondansetron Injectable 4 milliGRAM(s) IV Push every 8 hours  pantoprazole  Injectable 40 milliGRAM(s) IV Push every 12 hours  Parenteral Nutrition - Adult 1 Each (65 mL/Hr) TPN Continuous <Continuous>  Parenteral Nutrition - Adult 1 Each (60 mL/Hr) TPN Continuous <Continuous>    MEDICATIONS  (PRN):  acetaminophen     Tablet .. 650 milliGRAM(s) Oral every 6 hours PRN Mild Pain (1 - 3)  HYDROmorphone  Injectable 1 milliGRAM(s) IV Push every 10 minutes PRN Severe Pain (7 - 10)  HYDROmorphone  Injectable 0.5 milliGRAM(s) IV Push every 10 minutes PRN Moderate Pain (4 - 6)  morphine  - Injectable 2 milliGRAM(s) IV Push every 6 hours PRN Severe Pain (7 - 10)      DVT PROPHYLAXIS: enoxaparin Injectable 40 milliGRAM(s) SubCutaneous every 24 hours    GI PROPHYLAXIS: pantoprazole  Injectable 40 milliGRAM(s) IV Push every 12 hours    ANTICOAGULATION:   ANTIBIOTICS:  cefTRIAXone   IVPB 1000 milliGRAM(s)  metroNIDAZOLE  IVPB 500 milliGRAM(s)  metroNIDAZOLE  IVPB              LAB/STUDIES:  Labs:  CAPILLARY BLOOD GLUCOSE                              8.7    5.58  )-----------( 143      ( 21 Jun 2022 07:30 )             27.0       Auto Neutrophil %: 63.1 % (06-21-22 @ 07:30)  Auto Immature Granulocyte %: 2.0 % (06-21-22 @ 07:30)    06-21    143  |  109  |  9<L>  ----------------------------<  141<H>  4.0   |  21  |  0.7      Calcium, Total Serum: 8.2 mg/dL (06-21-22 @ 07:30)      LFTs:             5.6  | <0.2 | 17       ------------------[128     ( 21 Jun 2022 07:30 )  3.0  | x    | 18          Lipase:x      Amylase:x       BARIATRIC SURGERY PROGRESS NOTE    Patient: ZENON MEADOWS , 47y ([birth year])Female   MRN: [id number]  Location: [address]28 A  Visit: 06-16-22 Inpatient  Date: 06-22-22 @ 00:14    Hospital Day #: 6  Post-Op Day #: None    Procedure/Dx/Injuries: 47y F s/p sleeve gastrectomy complicated by a leak (5/18 Dr Tequila GLEZ) transferred to [facility] for endoscopic mgmt.    Events of past 24 hours: Patient seen and examined at bedside, HD stable. Tolerating diet.    PAST MEDICAL & SURGICAL HISTORY:  Obesity      S/P gastric sleeve procedure          Vitals:   T(F): 97.2 (06-21-22 @ 23:56), Max: 98.8 (06-21-22 @ 15:31)  HR: 64 (06-21-22 @ 23:56)  BP: 137/79 (06-21-22 @ 23:56)  RR: 18 (06-21-22 @ 23:56)  SpO2: 99% (06-21-22 @ 23:56)      Diet, Clear Liquid:   Bariatric Clear Liquid (BARICLLIQ)      Fluids:     I & O's:    06-20-22 @ 07:01  -  06-21-22 @ 07:00  --------------------------------------------------------  IN:    Fat Emulsion (Fish Oil &amp; Plant Based) 20% Infusion: 417 mL    IV PiggyBack: 250 mL    PPN (Peripheral Parenteral Nutrition): 780 mL  Total IN: 1447 mL    OUT:  Total OUT: 0 mL    Total NET: 1447 mL        Bowel Movement : [x] YES [] NO  Flatus : [x] YES [] NO    PHYSICAL EXAM:  General: NAD, AAOx3, calm and cooperative  HEENT: EOMI, Trachea ML, Neck supple  Cardiac: RRR  Respiratory: normal respiratory effort, breath sounds equal BL, no wheeze, rhonchi or crackles  Abdomen: Soft, non-distended, non-tender, no rebound, no guarding   Vascular: Pulses 2+ throughout, extremities well perfused    MEDICATIONS  (STANDING):  cefTRIAXone   IVPB 1000 milliGRAM(s) IV Intermittent every 24 hours  chlorhexidine 4% Liquid 1 Application(s) Topical <User Schedule>  enoxaparin Injectable 40 milliGRAM(s) SubCutaneous every 24 hours  losartan 100 milliGRAM(s) Oral daily  metroNIDAZOLE  IVPB 500 milliGRAM(s) IV Intermittent every 8 hours  metroNIDAZOLE  IVPB      ondansetron Injectable 4 milliGRAM(s) IV Push every 8 hours  pantoprazole  Injectable 40 milliGRAM(s) IV Push every 12 hours  Parenteral Nutrition - Adult 1 Each (65 mL/Hr) TPN Continuous <Continuous>  Parenteral Nutrition - Adult 1 Each (60 mL/Hr) TPN Continuous <Continuous>    MEDICATIONS  (PRN):  acetaminophen     Tablet .. 650 milliGRAM(s) Oral every 6 hours PRN Mild Pain (1 - 3)  HYDROmorphone  Injectable 1 milliGRAM(s) IV Push every 10 minutes PRN Severe Pain (7 - 10)  HYDROmorphone  Injectable 0.5 milliGRAM(s) IV Push every 10 minutes PRN Moderate Pain (4 - 6)  morphine  - Injectable 2 milliGRAM(s) IV Push every 6 hours PRN Severe Pain (7 - 10)      DVT PROPHYLAXIS: enoxaparin Injectable 40 milliGRAM(s) SubCutaneous every 24 hours    GI PROPHYLAXIS: pantoprazole  Injectable 40 milliGRAM(s) IV Push every 12 hours    ANTICOAGULATION:   ANTIBIOTICS:  cefTRIAXone   IVPB 1000 milliGRAM(s)  metroNIDAZOLE  IVPB 500 milliGRAM(s)  metroNIDAZOLE  IVPB              LAB/STUDIES:  Labs:  CAPILLARY BLOOD GLUCOSE                              8.7    5.58  )-----------( 143      ( 21 Jun 2022 07:30 )             27.0       Auto Neutrophil %: 63.1 % (06-21-22 @ 07:30)  Auto Immature Granulocyte %: 2.0 % (06-21-22 @ 07:30)    06-21    143  |  109  |  9<L>  ----------------------------<  141<H>  4.0   |  21  |  0.7      Calcium, Total Serum: 8.2 mg/dL (06-21-22 @ 07:30)      LFTs:             5.6  | <0.2 | 17       ------------------[128     ( 21 Jun 2022 07:30 )  3.0  | x    | 18          Lipase:x      Amylase:x

## 2022-06-23 ENCOUNTER — TRANSCRIPTION ENCOUNTER (OUTPATIENT)
Age: 47
End: 2022-06-23

## 2022-06-23 VITALS
DIASTOLIC BLOOD PRESSURE: 66 MMHG | HEART RATE: 60 BPM | TEMPERATURE: 98 F | RESPIRATION RATE: 18 BRPM | SYSTOLIC BLOOD PRESSURE: 132 MMHG

## 2022-06-23 LAB
ALBUMIN SERPL ELPH-MCNC: 3.5 G/DL — SIGNIFICANT CHANGE UP (ref 3.5–5.2)
ALP SERPL-CCNC: 120 U/L — HIGH (ref 30–115)
ALT FLD-CCNC: 13 U/L — SIGNIFICANT CHANGE UP (ref 0–41)
ANION GAP SERPL CALC-SCNC: 11 MMOL/L — SIGNIFICANT CHANGE UP (ref 7–14)
AST SERPL-CCNC: 12 U/L — SIGNIFICANT CHANGE UP (ref 0–41)
BASOPHILS # BLD AUTO: 0.03 K/UL — SIGNIFICANT CHANGE UP (ref 0–0.2)
BASOPHILS NFR BLD AUTO: 0.4 % — SIGNIFICANT CHANGE UP (ref 0–1)
BILIRUB SERPL-MCNC: 0.3 MG/DL — SIGNIFICANT CHANGE UP (ref 0.2–1.2)
BUN SERPL-MCNC: 10 MG/DL — SIGNIFICANT CHANGE UP (ref 10–20)
CALCIUM SERPL-MCNC: 8.6 MG/DL — SIGNIFICANT CHANGE UP (ref 8.5–10.1)
CHLORIDE SERPL-SCNC: 106 MMOL/L — SIGNIFICANT CHANGE UP (ref 98–110)
CO2 SERPL-SCNC: 21 MMOL/L — SIGNIFICANT CHANGE UP (ref 17–32)
CREAT SERPL-MCNC: 0.7 MG/DL — SIGNIFICANT CHANGE UP (ref 0.7–1.5)
EGFR: 108 ML/MIN/1.73M2 — SIGNIFICANT CHANGE UP
EOSINOPHIL # BLD AUTO: 0.2 K/UL — SIGNIFICANT CHANGE UP (ref 0–0.7)
EOSINOPHIL NFR BLD AUTO: 2.8 % — SIGNIFICANT CHANGE UP (ref 0–8)
GLUCOSE BLDC GLUCOMTR-MCNC: 105 MG/DL — HIGH (ref 70–99)
GLUCOSE BLDC GLUCOMTR-MCNC: 116 MG/DL — HIGH (ref 70–99)
GLUCOSE BLDC GLUCOMTR-MCNC: 95 MG/DL — SIGNIFICANT CHANGE UP (ref 70–99)
GLUCOSE BLDC GLUCOMTR-MCNC: 97 MG/DL — SIGNIFICANT CHANGE UP (ref 70–99)
GLUCOSE SERPL-MCNC: 132 MG/DL — HIGH (ref 70–99)
HCT VFR BLD CALC: 31.4 % — LOW (ref 37–47)
HGB BLD-MCNC: 10.2 G/DL — LOW (ref 12–16)
IMM GRANULOCYTES NFR BLD AUTO: 1.4 % — HIGH (ref 0.1–0.3)
LYMPHOCYTES # BLD AUTO: 1.29 K/UL — SIGNIFICANT CHANGE UP (ref 1.2–3.4)
LYMPHOCYTES # BLD AUTO: 18.3 % — LOW (ref 20.5–51.1)
MAGNESIUM SERPL-MCNC: 1.9 MG/DL — SIGNIFICANT CHANGE UP (ref 1.8–2.4)
MCHC RBC-ENTMCNC: 28.8 PG — SIGNIFICANT CHANGE UP (ref 27–31)
MCHC RBC-ENTMCNC: 32.5 G/DL — SIGNIFICANT CHANGE UP (ref 32–37)
MCV RBC AUTO: 88.7 FL — SIGNIFICANT CHANGE UP (ref 81–99)
MONOCYTES # BLD AUTO: 0.44 K/UL — SIGNIFICANT CHANGE UP (ref 0.1–0.6)
MONOCYTES NFR BLD AUTO: 6.3 % — SIGNIFICANT CHANGE UP (ref 1.7–9.3)
NEUTROPHILS # BLD AUTO: 4.98 K/UL — SIGNIFICANT CHANGE UP (ref 1.4–6.5)
NEUTROPHILS NFR BLD AUTO: 70.8 % — SIGNIFICANT CHANGE UP (ref 42.2–75.2)
NRBC # BLD: 0 /100 WBCS — SIGNIFICANT CHANGE UP (ref 0–0)
PHOSPHATE SERPL-MCNC: 4 MG/DL — SIGNIFICANT CHANGE UP (ref 2.1–4.9)
PLATELET # BLD AUTO: 164 K/UL — SIGNIFICANT CHANGE UP (ref 130–400)
POTASSIUM SERPL-MCNC: 4.1 MMOL/L — SIGNIFICANT CHANGE UP (ref 3.5–5)
POTASSIUM SERPL-SCNC: 4.1 MMOL/L — SIGNIFICANT CHANGE UP (ref 3.5–5)
PROT SERPL-MCNC: 6.1 G/DL — SIGNIFICANT CHANGE UP (ref 6–8)
RBC # BLD: 3.54 M/UL — LOW (ref 4.2–5.4)
RBC # FLD: 15.3 % — HIGH (ref 11.5–14.5)
SODIUM SERPL-SCNC: 138 MMOL/L — SIGNIFICANT CHANGE UP (ref 135–146)
WBC # BLD: 7.04 K/UL — SIGNIFICANT CHANGE UP (ref 4.8–10.8)
WBC # FLD AUTO: 7.04 K/UL — SIGNIFICANT CHANGE UP (ref 4.8–10.8)

## 2022-06-23 PROCEDURE — 99239 HOSP IP/OBS DSCHRG MGMT >30: CPT

## 2022-06-23 PROCEDURE — 99232 SBSQ HOSP IP/OBS MODERATE 35: CPT

## 2022-06-23 RX ORDER — MAGNESIUM SULFATE 500 MG/ML
1 VIAL (ML) INJECTION ONCE
Refills: 0 | Status: COMPLETED | OUTPATIENT
Start: 2022-06-23 | End: 2022-06-23

## 2022-06-23 RX ORDER — SODIUM CHLORIDE 9 MG/ML
1000 INJECTION, SOLUTION INTRAVENOUS
Refills: 0 | Status: DISCONTINUED | OUTPATIENT
Start: 2022-06-23 | End: 2022-06-23

## 2022-06-23 RX ADMIN — ONDANSETRON 4 MILLIGRAM(S): 8 TABLET, FILM COATED ORAL at 05:23

## 2022-06-23 RX ADMIN — Medication 100 GRAM(S): at 13:49

## 2022-06-23 RX ADMIN — SODIUM CHLORIDE 40 MILLILITER(S): 9 INJECTION, SOLUTION INTRAVENOUS at 11:49

## 2022-06-23 RX ADMIN — ONDANSETRON 4 MILLIGRAM(S): 8 TABLET, FILM COATED ORAL at 13:49

## 2022-06-23 RX ADMIN — Medication 100 MILLIGRAM(S): at 06:51

## 2022-06-23 RX ADMIN — Medication 100 MILLIGRAM(S): at 13:47

## 2022-06-23 RX ADMIN — CHLORHEXIDINE GLUCONATE 1 APPLICATION(S): 213 SOLUTION TOPICAL at 05:32

## 2022-06-23 RX ADMIN — PANTOPRAZOLE SODIUM 40 MILLIGRAM(S): 20 TABLET, DELAYED RELEASE ORAL at 05:20

## 2022-06-23 RX ADMIN — LOSARTAN POTASSIUM 100 MILLIGRAM(S): 100 TABLET, FILM COATED ORAL at 05:20

## 2022-06-23 RX ADMIN — CEFTRIAXONE 100 MILLIGRAM(S): 500 INJECTION, POWDER, FOR SOLUTION INTRAMUSCULAR; INTRAVENOUS at 05:20

## 2022-06-23 NOTE — DISCHARGE NOTE PROVIDER - HOSPITAL COURSE
48 yo F with PMH gastric sleeve 5/18/22 at [facility] with Dr Clarke Mandel who presents with abd pain for a week . The patient was transferred from [facility] after CT scan showed fluid collection suspicious for contained leak .the patient originally presented here and was transferred to [facility] then transferred back after ct scan findings.  the pain started 6/10 is Diffuse, non radiating, associated with nausea and vomiting,and constipation  The patient tested positive for covid postoperatively ,and again on this admission ,pt is asymptomatic on RA.     CT Abdomen and Pelvis w/ Oral Cont and w/ IV Cont (06.13.22 @ 17:09) >  Prior sleeve gastrectomy with irregularity of the suture line and   adjacent fluid collection measuring up to 6.2 cm. Findings suspicious for   contained leak and/or abscess formation.    EGD done with stent placement. Gastrografin study afterwards: Limited esophagram demonstrates no extraluminal contrast to suggest leak.    Patient was started on ceftriaxone and metronidazole. Patient was also started on TPN and put NPO until no leak was confirmed. Then, TPN was started to be weaned off and diet slowly advanced.

## 2022-06-23 NOTE — DISCHARGE NOTE PROVIDER - CARE PROVIDERS DIRECT ADDRESSES
[email or web address]

## 2022-06-23 NOTE — DISCHARGE NOTE PROVIDER - CARE PROVIDER_API CALL
Caleb Castro (DO)  Medicine  [address] [place] [address], [address] [place] [address]  Phone: [phone]  Fax: [phone]  Follow Up Time:

## 2022-06-23 NOTE — PROGRESS NOTE ADULT - ATTENDING COMMENTS
48yo female COVID+ s/p sleeve gastrectomy 5/18/2022 at [facility] complicated by staple line leak transferred to [facility] for endoscopic management s/p endoscopic clip placement of gastrotomy. Patient states abdominal pain and nausea have completely resolved. Tolerating CLD. Labs reviewed. Recommend continued bariatric CLD, wean TPN, serial abdominal exams, monitor vital signs and labs for sepsis. F/U GI. Bariatric surgery signing off. COVID precautions. Primary care as pre primary team.

## 2022-06-23 NOTE — DISCHARGE NOTE PROVIDER - NSDCCPCAREPLAN_GEN_ALL_CORE_FT
PRINCIPAL DISCHARGE DIAGNOSIS  Diagnosis: Abdominal fluid collection  Assessment and Plan of Treatment: fluid collection at the site of sleeve surgery

## 2022-06-23 NOTE — DISCHARGE NOTE NURSING/CASE MANAGEMENT/SOCIAL WORK - NSDCPEFALRISK_GEN_ALL_CORE
For information on Fall & Injury Prevention, visit: [email or web address] OR  [email or web address] OR  https://www.cdc.gov/steadi/patient.html

## 2022-06-23 NOTE — DISCHARGE NOTE PROVIDER - NSDCMRMEDTOKEN_GEN_ALL_CORE_FT
levoFLOXacin 750 mg oral tablet: 1 tab(s) orally once a day   losartan 100 mg oral tablet: 1 tab(s) orally once a day  metroNIDAZOLE 500 mg oral tablet: 1 tab(s) orally 3 times a day   Vitamin B-12 100 mcg oral tablet: 1 tab(s) orally once a day

## 2022-06-23 NOTE — PROGRESS NOTE ADULT - REASON FOR ADMISSION
Abdominal pain and emesis

## 2022-06-23 NOTE — PROGRESS NOTE ADULT - ASSESSMENT
46 yo F with PMH gastric sleeve 22 at [facility] with Dr Clarke Mandel who presents with abd pain for a week . The patient was transferred from [facility] after CT scan showed fluid collection suspicious for contained leak. The patient originally presented here at [facility] and was transferred to [facility] then transferred back after ct scan findings.  Currently admitted to medicine with the primary diagnosis of abd fluid collection.    #Diffuse abdominal pain secondary to contained leak and/or abscess  #S/P gastric sleeve surgery 22 at [facility]  --transferred from [facility] for concern of contained leak  -sp gastric sleeve 22 at [facility] with Dr Clarke Mandel   -CT 22 Prior sleeve gastrectomy with irregularity of the suture line and   adjacent fluid collection measuring up to 6.2 cm. Findings suspicious for   contained leak and/or abscess formation.  - - GI consulted, s/p EGD  < from: EGD (22 @ 15:30) >  A 5mm full thickness defect was noted between the folds that  was consistent with the suspected site of leak, and pus and purulent fluid was  noted to be draining out of the opening and likely coming from the collection  seen on CT.    The endoscope was then exchanged to a  scope and the collection was  evaluated and noted to have pus that was washed and suctioned. The scope was  exchanged to standard endoscope loaded with a 12mm OVESCO clip that was  successfully deployed to close the defect.    <- ID consulted, recs appreciated   - Nutrition: was on TPN. Since gastrografin did not show a leak, clear liquid diet started.now tolerating it well. po levaquin and flagyl for 14 days    #Mild COVID (detected ), asymptomatic  - On room air  - continue to monitor    #Epistaxis - resolved  - cont. to monitor    #HTN   -on home losartan 100mg  - c/w losartan    Dc home today--spent more than 30mins As nutrition is tapering off TPN-- patient eager to go home.

## 2022-06-23 NOTE — PROGRESS NOTE ADULT - ASSESSMENT
ASSESSMENT:  47y F s/p sleeve gastrectomy complicated by a leak (5/18 Dr Tequila GLEZ) transferred to [facility] for endoscopic mgmt.    PLAN:  -Management per primary team  -Continue to ween TPN as patient is tolerating bariatric full liquid diet   -Pain control  -Monitor bowel function   -Patient may be discharged from surgical standpoint    BLUE TEAM SPECTRA: [id number] ASSESSMENT:  47y F s/p sleeve gastrectomy complicated by a leak (5/18 Dr Tequila GLEZ) transferred to [facility] for endoscopic mgmt.    PLAN:  -Management per primary team  -Continue to ween TPN as patient is tolerating bariatric full liquid diet   -Pain control  -Monitor bowel function   -Recall PRN  -Patient may be discharged from surgical standpoint    BLUE TEAM SPECTRA: [id number]

## 2022-06-23 NOTE — PROGRESS NOTE ADULT - SUBJECTIVE AND OBJECTIVE BOX
SUBJECTIVE:    Patient is a 47y old Female who presents with a chief complaint of Abdominal pain and emesis (23 Jun 2022 07:50)    Currently admitted to medicine with the primary diagnosis of Abdominal fluid collection       Today is hospital day 7d.     PAST MEDICAL & SURGICAL HISTORY  Obesity    S/P gastric sleeve procedure      ALLERGIES:  No Known Allergies    MEDICATIONS:  STANDING MEDICATIONS  chlorhexidine 4% Liquid 1 Application(s) Topical <User Schedule>  dextrose 5% + sodium chloride 0.9%. 1000 milliLiter(s) IV Continuous <Continuous>  enoxaparin Injectable 40 milliGRAM(s) SubCutaneous every 24 hours  losartan 100 milliGRAM(s) Oral daily  metroNIDAZOLE  IVPB      metroNIDAZOLE  IVPB 500 milliGRAM(s) IV Intermittent every 8 hours  ondansetron Injectable 4 milliGRAM(s) IV Push every 8 hours  pantoprazole  Injectable 40 milliGRAM(s) IV Push every 12 hours  Parenteral Nutrition - Adult 1 Each TPN Continuous <Continuous>    PRN MEDICATIONS  acetaminophen     Tablet .. 650 milliGRAM(s) Oral every 6 hours PRN  HYDROmorphone  Injectable 0.5 milliGRAM(s) IV Push every 10 minutes PRN  morphine  - Injectable 2 milliGRAM(s) IV Push every 6 hours PRN    VITALS:   T(F): 97.8  HR: 62  BP: 148/77  RR: 18  SpO2: --    LABS:                        10.2   7.04  )-----------( 164      ( 23 Jun 2022 09:19 )             31.4     06-23    138  |  106  |  10  ----------------------------<  132<H>  4.1   |  21  |  0.7    Ca    8.6      23 Jun 2022 09:19  Phos  4.0     06-23  Mg     1.9     06-23    TPro  6.1  /  Alb  3.5  /  TBili  0.3  /  DBili  x   /  AST  12  /  ALT  13  /  AlkPhos  120<H>  06-23                  RADIOLOGY:    PHYSICAL EXAM:  GEN: No acute distress  LUNGS: Clear to auscultation bilaterally   HEART: S1/S2 present. RRR.   ABD/ GI: Soft, non-tender, non-distended. Bowel sounds present  EXT: NC/NC/NE/2+PP/BAKER  NEURO: AAOX3

## 2022-06-23 NOTE — PROGRESS NOTE ADULT - SUBJECTIVE AND OBJECTIVE BOX
GENERAL SURGERY PROGRESS NOTE    Patient: ZENON MEADOWS , 47y ([birth year])Female   MRN: [id number]  Location: [address] 028 A  Visit: 06-16-22 Inpatient  Date: 06-23-22 @ 02:02    Hospital Day #:8    Procedure/Dx/Injuries: s/p sleeve gastrectomy complicated by a leak (5/18 Dr Tequila GLEZ) transferred to [facility] for endoscopic mgmt    Events of past 24 hours: Pt seen and examined at bedside. Pt expresses her wishes to be discharged. Pt tolerating bariatric full liquid diet. Pt passing flatus and having BM. TPN being tapered. No acute overnight events Afebrile     PAST MEDICAL & SURGICAL HISTORY:  Obesity  S/P gastric sleeve procedure    Vitals:   T(F): 97.5 (06-23-22 @ 00:00), Max: 98.3 (06-22-22 @ 16:50)  HR: 68 (06-23-22 @ 00:00)  BP: 149/72 (06-23-22 @ 00:00)  RR: 18 (06-23-22 @ 00:00)  SpO2: --    Diet, Full Liquid    I & O's:  06-21-22 @ 07:01  -  06-22-22 @ 07:00  --------------------------------------------------------  IN:    IV PiggyBack: 250 mL    PPN (Peripheral Parenteral Nutrition): 600 mL  Total IN: 850 mL  OUT:  Total OUT: 0 mL  Total NET: 850 mL    PHYSICAL EXAM:  General: NAD, AAOx3, calm and cooperative  HEENT: NCAT, EOMI  Cardiac: S1, S2  Respiratory: normal respiratory effort, b/l breath sounds   Abdomen: Soft, non-distended, non-tender, no rebound, no guarding  Vascular: extremities well perfused  Skin: Warm/dry, normal color, no jaundice    MEDICATIONS  (STANDING):  cefTRIAXone   IVPB 1000 milliGRAM(s) IV Intermittent every 24 hours  chlorhexidine 4% Liquid 1 Application(s) Topical <User Schedule>  enoxaparin Injectable 40 milliGRAM(s) SubCutaneous every 24 hours  losartan 100 milliGRAM(s) Oral daily  metroNIDAZOLE  IVPB 500 milliGRAM(s) IV Intermittent every 8 hours  metroNIDAZOLE  IVPB      ondansetron Injectable 4 milliGRAM(s) IV Push every 8 hours  pantoprazole  Injectable 40 milliGRAM(s) IV Push every 12 hours  Parenteral Nutrition - Adult 1 Each (42 mL/Hr) TPN Continuous <Continuous>  Parenteral Nutrition - Adult 1 Each (60 mL/Hr) TPN Continuous <Continuous>    MEDICATIONS  (PRN):  acetaminophen     Tablet .. 650 milliGRAM(s) Oral every 6 hours PRN Mild Pain (1 - 3)  HYDROmorphone  Injectable 0.5 milliGRAM(s) IV Push every 10 minutes PRN Moderate Pain (4 - 6)  morphine  - Injectable 2 milliGRAM(s) IV Push every 6 hours PRN Severe Pain (7 - 10)    DVT PROPHYLAXIS: enoxaparin Injectable 40 milliGRAM(s) SubCutaneous every 24 hours  GI PROPHYLAXIS: pantoprazole  Injectable 40 milliGRAM(s) IV Push every 12 hours  ANTIBIOTICS:  cefTRIAXone   IVPB 1000 milliGRAM(s)  metroNIDAZOLE  IVPB 500 milliGRAM(s)  metroNIDAZOLE  IVPB      LAB/STUDIES:  Labs:                        8.8    6.30  )-----------( 142      ( 22 Jun 2022 07:29 )             27.8       Auto Neutrophil %: 64.2 % (06-22-22 @ 07:29)  Auto Immature Granulocyte %: 1.7 % (06-22-22 @ 07:29)    06-22    136  |  104  |  11  ----------------------------<  127<H>  4.0   |  23  |  0.6<L>    Magnesium, Serum: 2.2 mg/dL (06-22-22 @ 07:29)    LFTs:             5.5  | <0.2 | 11       ------------------[110     ( 22 Jun 2022 07:29 )  3.1  | x    | 14          Lipase:x      Amylase:x        IMAGING:  No new imaging past 24hrs BARIATRIC SURGERY PROGRESS NOTE    Patient: ZENON MEADOWS , 47y ([birth year])Female   MRN: [id number]  Location: [address] 028 A  Visit: 06-16-22 Inpatient  Date: 06-23-22 @ 02:02    Hospital Day #:8    Procedure/Dx/Injuries: s/p sleeve gastrectomy complicated by a leak (5/18 Dr Tequila GLEZ) transferred to [facility] for endoscopic mgmt    Events of past 24 hours: Pt seen and examined at bedside. Pt expresses her wishes to be discharged. Pt tolerating bariatric full liquid diet. Pt passing flatus and having BM. TPN being tapered. No acute overnight events Afebrile     PAST MEDICAL & SURGICAL HISTORY:  Obesity  S/P gastric sleeve procedure    Vitals:   T(F): 97.5 (06-23-22 @ 00:00), Max: 98.3 (06-22-22 @ 16:50)  HR: 68 (06-23-22 @ 00:00)  BP: 149/72 (06-23-22 @ 00:00)  RR: 18 (06-23-22 @ 00:00)  SpO2: --    Diet, Full Liquid    I & O's:  06-21-22 @ 07:01  -  06-22-22 @ 07:00  --------------------------------------------------------  IN:    IV PiggyBack: 250 mL    PPN (Peripheral Parenteral Nutrition): 600 mL  Total IN: 850 mL  OUT:  Total OUT: 0 mL  Total NET: 850 mL    PHYSICAL EXAM:  General: NAD, AAOx3, calm and cooperative  HEENT: NCAT, EOMI  Cardiac: S1, S2  Respiratory: normal respiratory effort, b/l breath sounds   Abdomen: Soft, non-distended, non-tender, no rebound, no guarding  Vascular: extremities well perfused  Skin: Warm/dry, normal color, no jaundice    MEDICATIONS  (STANDING):  cefTRIAXone   IVPB 1000 milliGRAM(s) IV Intermittent every 24 hours  chlorhexidine 4% Liquid 1 Application(s) Topical <User Schedule>  enoxaparin Injectable 40 milliGRAM(s) SubCutaneous every 24 hours  losartan 100 milliGRAM(s) Oral daily  metroNIDAZOLE  IVPB 500 milliGRAM(s) IV Intermittent every 8 hours  metroNIDAZOLE  IVPB      ondansetron Injectable 4 milliGRAM(s) IV Push every 8 hours  pantoprazole  Injectable 40 milliGRAM(s) IV Push every 12 hours  Parenteral Nutrition - Adult 1 Each (42 mL/Hr) TPN Continuous <Continuous>  Parenteral Nutrition - Adult 1 Each (60 mL/Hr) TPN Continuous <Continuous>    MEDICATIONS  (PRN):  acetaminophen     Tablet .. 650 milliGRAM(s) Oral every 6 hours PRN Mild Pain (1 - 3)  HYDROmorphone  Injectable 0.5 milliGRAM(s) IV Push every 10 minutes PRN Moderate Pain (4 - 6)  morphine  - Injectable 2 milliGRAM(s) IV Push every 6 hours PRN Severe Pain (7 - 10)    DVT PROPHYLAXIS: enoxaparin Injectable 40 milliGRAM(s) SubCutaneous every 24 hours  GI PROPHYLAXIS: pantoprazole  Injectable 40 milliGRAM(s) IV Push every 12 hours  ANTIBIOTICS:  cefTRIAXone   IVPB 1000 milliGRAM(s)  metroNIDAZOLE  IVPB 500 milliGRAM(s)  metroNIDAZOLE  IVPB      LAB/STUDIES:  Labs:                        8.8    6.30  )-----------( 142      ( 22 Jun 2022 07:29 )             27.8       Auto Neutrophil %: 64.2 % (06-22-22 @ 07:29)  Auto Immature Granulocyte %: 1.7 % (06-22-22 @ 07:29)    06-22    136  |  104  |  11  ----------------------------<  127<H>  4.0   |  23  |  0.6<L>    Magnesium, Serum: 2.2 mg/dL (06-22-22 @ 07:29)    LFTs:             5.5  | <0.2 | 11       ------------------[110     ( 22 Jun 2022 07:29 )  3.1  | x    | 14          Lipase:x      Amylase:x        IMAGING:  No new imaging past 24hrs

## 2022-06-23 NOTE — DISCHARGE NOTE NURSING/CASE MANAGEMENT/SOCIAL WORK - PATIENT PORTAL LINK FT
You can access the FollowMyHealth Patient Portal offered by [facility] by registering at the following website: [email or web address]. By joining [employer]’s FollowMyHealth portal, you will also be able to view your health information using other applications (apps) compatible with our system.

## 2022-06-24 PROBLEM — E66.9 OBESITY, UNSPECIFIED: Chronic | Status: ACTIVE | Noted: 2022-06-17

## 2022-07-01 DIAGNOSIS — I10 ESSENTIAL (PRIMARY) HYPERTENSION: ICD-10-CM

## 2022-07-01 DIAGNOSIS — U07.1 COVID-19: ICD-10-CM

## 2022-07-01 DIAGNOSIS — K20.80 OTHER ESOPHAGITIS WITHOUT BLEEDING: ICD-10-CM

## 2022-07-01 DIAGNOSIS — E83.42 HYPOMAGNESEMIA: ICD-10-CM

## 2022-07-01 DIAGNOSIS — D50.9 IRON DEFICIENCY ANEMIA, UNSPECIFIED: ICD-10-CM

## 2022-07-01 DIAGNOSIS — K95.89 OTHER COMPLICATIONS OF OTHER BARIATRIC PROCEDURE: ICD-10-CM

## 2022-07-01 DIAGNOSIS — E87.6 HYPOKALEMIA: ICD-10-CM

## 2022-07-01 DIAGNOSIS — E66.01 MORBID (SEVERE) OBESITY DUE TO EXCESS CALORIES: ICD-10-CM

## 2022-07-01 DIAGNOSIS — R04.0 EPISTAXIS: ICD-10-CM

## 2022-07-07 ENCOUNTER — OUTPATIENT (OUTPATIENT)
Dept: OUTPATIENT SERVICES | Facility: HOSPITAL | Age: 47
LOS: 1 days | Discharge: HOME | End: 2022-07-07

## 2022-07-07 ENCOUNTER — APPOINTMENT (OUTPATIENT)
Dept: INTERNAL MEDICINE | Facility: CLINIC | Age: 47
End: 2022-07-07

## 2022-07-07 VITALS
BODY MASS INDEX: 33.67 KG/M2 | HEART RATE: 77 BPM | DIASTOLIC BLOOD PRESSURE: 85 MMHG | HEIGHT: 66.5 IN | SYSTOLIC BLOOD PRESSURE: 133 MMHG | WEIGHT: 212 LBS | OXYGEN SATURATION: 99 % | TEMPERATURE: 98.2 F

## 2022-07-07 DIAGNOSIS — Z87.891 PERSONAL HISTORY OF NICOTINE DEPENDENCE: ICD-10-CM

## 2022-07-07 DIAGNOSIS — Z90.3 ACQUIRED ABSENCE OF STOMACH [PART OF]: Chronic | ICD-10-CM

## 2022-07-07 DIAGNOSIS — Z82.49 FAMILY HISTORY OF ISCHEMIC HEART DISEASE AND OTHER DISEASES OF THE CIRCULATORY SYSTEM: ICD-10-CM

## 2022-07-20 NOTE — HISTORY OF PRESENT ILLNESS
[de-identified] : 47 year old female patient known to have HTN, gastric sleeve in June 2022 complicated with a leak status post EGD.\par She presented for follow up. No diarrhea, no constipation, no abdominal pain, no fever, no chills. She reported mild nausea controlled with Meclizine. She mentioned having occasional vomiting non-bilous non bloody especially after eating a relatively large meal. She is tolerating PO intake.

## 2022-08-11 DIAGNOSIS — Z00.00 ENCOUNTER FOR GENERAL ADULT MEDICAL EXAMINATION W/OUT ABNORMAL FINDINGS: ICD-10-CM

## 2022-08-11 DIAGNOSIS — R11.2 NAUSEA WITH VOMITING, UNSPECIFIED: ICD-10-CM

## 2022-08-11 DIAGNOSIS — Z98.84 BARIATRIC SURGERY STATUS: ICD-10-CM

## 2022-08-11 PROCEDURE — 99202 OFFICE O/P NEW SF 15 MIN: CPT | Mod: GC

## 2022-08-11 PROCEDURE — 99212 OFFICE O/P EST SF 10 MIN: CPT | Mod: GC

## 2023-05-24 ENCOUNTER — APPOINTMENT (OUTPATIENT)
Dept: ORTHOPEDIC SURGERY | Facility: CLINIC | Age: 48
End: 2023-05-24
Payer: COMMERCIAL

## 2023-05-24 DIAGNOSIS — M25.562 PAIN IN LEFT KNEE: ICD-10-CM

## 2023-05-24 DIAGNOSIS — Z78.9 OTHER SPECIFIED HEALTH STATUS: ICD-10-CM

## 2023-05-24 PROCEDURE — 99203 OFFICE O/P NEW LOW 30 MIN: CPT

## 2023-05-24 PROCEDURE — 73562 X-RAY EXAM OF KNEE 3: CPT | Mod: LT

## 2023-05-24 NOTE — PHYSICAL EXAM
[Left] : left knee [de-identified] : General appearance the patient is unremarkable, well developed, well nourished, well groomed with no deformities.  Patient is alert and oriented.  Patient is able to communicate clearly.  Visible skin on the head, face, right upper extremity, left upper extremity and bilateral lower extremities are normal showing no rashes, lesions or ulcers.  Normal peripheral vascular system.  Normal coordination, mood and affect.  [NL (140)] : flexion 140 degrees [NL (0)] : extension 0 degrees [5___] : hamstring 5[unfilled]/5 [Negative] : negative Jenny's [] : patient ambulates without assistive device

## 2023-05-24 NOTE — HISTORY OF PRESENT ILLNESS
[de-identified] : Patient is a 48-year-old female coming in today for acute left knee pain.  Is been going on for a while she had an x-ray on 9 May by Hubert Benjamin.  She was told by her primary that she has a popliteal cyst and was told to follow-up with orthopedics.  In today for initial orthopedic evaluation

## 2023-05-24 NOTE — ASSESSMENT
[FreeTextEntry1] : Discussed with patient physical exam findings were benign today.  HPI consistent with popliteal cyst.  She was given a handout from the [employer] on Baker's cyst/popliteal cysts.  She was referred to physical therapy for quadricep strengthening.  At this time she has a history of hypertension taking both  hydrochlorothiazide and losartan as well as a history of gastric surgery within the last year.  Due to this we can avoid NSAIDs at this time.  She will continue with OTC Tylenol arthritis as needed for pain.  She will follow-up in our sports medicine department in 6 weeks.  If she is having significant pain she can call the office and we will bring her in sooner for cortisone shot.  She expressed understanding and had no additional questions or concerns

## 2023-05-24 NOTE — DATA REVIEWED
[Left] : left [Knee] : knee [I independently reviewed and interpreted images and report] : I independently reviewed and interpreted images and report [FreeTextEntry1] : No acute fx, luxation or dislocation.  Mild to moderate medial joint space narrowing noted

## 2023-05-24 NOTE — HISTORY OF PRESENT ILLNESS
09-Jun-2022 13:31:35 [de-identified] : Patient is a 48-year-old female coming in today for acute left knee pain.  Is been going on for a while she had an x-ray on 9 May by Hubert Benjamin.  She was told by her primary that she has a popliteal cyst and was told to follow-up with orthopedics.  In today for initial orthopedic evaluation

## 2023-05-24 NOTE — PHYSICAL EXAM
[Left] : left knee [de-identified] : General appearance the patient is unremarkable, well developed, well nourished, well groomed with no deformities.  Patient is alert and oriented.  Patient is able to communicate clearly.  Visible skin on the head, face, right upper extremity, left upper extremity and bilateral lower extremities are normal showing no rashes, lesions or ulcers.  Normal peripheral vascular system.  Normal coordination, mood and affect.  [NL (140)] : flexion 140 degrees [NL (0)] : extension 0 degrees [5___] : hamstring 5[unfilled]/5 [Negative] : negative Jenny's [] : patient ambulates without assistive device

## 2023-07-06 ENCOUNTER — APPOINTMENT (OUTPATIENT)
Dept: ORTHOPEDIC SURGERY | Facility: CLINIC | Age: 48
End: 2023-07-06

## 2025-04-26 ENCOUNTER — EMERGENCY (EMERGENCY)
Facility: HOSPITAL | Age: 50
LOS: 0 days | Discharge: HOME | End: 2025-04-26
Attending: EMERGENCY MEDICINE
Payer: COMMERCIAL

## 2025-04-26 VITALS
OXYGEN SATURATION: 99 % | SYSTOLIC BLOOD PRESSURE: 130 MMHG | HEART RATE: 84 BPM | DIASTOLIC BLOOD PRESSURE: 83 MMHG | RESPIRATION RATE: 19 BRPM

## 2025-04-26 VITALS — WEIGHT: 214.95 LBS | HEIGHT: 66 IN | SYSTOLIC BLOOD PRESSURE: 132 MMHG | DIASTOLIC BLOOD PRESSURE: 81 MMHG

## 2025-04-26 DIAGNOSIS — Z98.890 OTHER SPECIFIED POSTPROCEDURAL STATES: ICD-10-CM

## 2025-04-26 DIAGNOSIS — I10 ESSENTIAL (PRIMARY) HYPERTENSION: ICD-10-CM

## 2025-04-26 DIAGNOSIS — Z87.19 PERSONAL HISTORY OF OTHER DISEASES OF THE DIGESTIVE SYSTEM: ICD-10-CM

## 2025-04-26 DIAGNOSIS — Z90.3 ACQUIRED ABSENCE OF STOMACH [PART OF]: Chronic | ICD-10-CM

## 2025-04-26 DIAGNOSIS — R51.9 HEADACHE, UNSPECIFIED: ICD-10-CM

## 2025-04-26 DIAGNOSIS — R11.2 NAUSEA WITH VOMITING, UNSPECIFIED: ICD-10-CM

## 2025-04-26 LAB
ALBUMIN SERPL ELPH-MCNC: 4.1 G/DL — SIGNIFICANT CHANGE UP (ref 3.5–5.2)
ALP SERPL-CCNC: 76 U/L — SIGNIFICANT CHANGE UP (ref 30–115)
ALT FLD-CCNC: 41 U/L — SIGNIFICANT CHANGE UP (ref 0–41)
ANION GAP SERPL CALC-SCNC: 14 MMOL/L — SIGNIFICANT CHANGE UP (ref 7–14)
AST SERPL-CCNC: 57 U/L — HIGH (ref 0–41)
BASOPHILS # BLD AUTO: 0.02 K/UL — SIGNIFICANT CHANGE UP (ref 0–0.2)
BASOPHILS NFR BLD AUTO: 0.4 % — SIGNIFICANT CHANGE UP (ref 0–1)
BILIRUB SERPL-MCNC: 0.5 MG/DL — SIGNIFICANT CHANGE UP (ref 0.2–1.2)
BUN SERPL-MCNC: 12 MG/DL — SIGNIFICANT CHANGE UP (ref 10–20)
CALCIUM SERPL-MCNC: 9 MG/DL — SIGNIFICANT CHANGE UP (ref 8.4–10.5)
CHLORIDE SERPL-SCNC: 99 MMOL/L — SIGNIFICANT CHANGE UP (ref 98–110)
CO2 SERPL-SCNC: 23 MMOL/L — SIGNIFICANT CHANGE UP (ref 17–32)
CREAT SERPL-MCNC: 0.7 MG/DL — SIGNIFICANT CHANGE UP (ref 0.7–1.5)
EGFR: 106 ML/MIN/1.73M2 — SIGNIFICANT CHANGE UP
EGFR: 106 ML/MIN/1.73M2 — SIGNIFICANT CHANGE UP
EOSINOPHIL # BLD AUTO: 0.06 K/UL — SIGNIFICANT CHANGE UP (ref 0–0.7)
EOSINOPHIL NFR BLD AUTO: 1.3 % — SIGNIFICANT CHANGE UP (ref 0–8)
GLUCOSE SERPL-MCNC: 110 MG/DL — HIGH (ref 70–99)
HCG SERPL QL: NEGATIVE — SIGNIFICANT CHANGE UP
HCT VFR BLD CALC: 42.8 % — SIGNIFICANT CHANGE UP (ref 37–47)
HGB BLD-MCNC: 14.8 G/DL — SIGNIFICANT CHANGE UP (ref 12–16)
IMM GRANULOCYTES NFR BLD AUTO: 0.2 % — SIGNIFICANT CHANGE UP (ref 0.1–0.3)
LYMPHOCYTES # BLD AUTO: 0.4 K/UL — LOW (ref 1.2–3.4)
LYMPHOCYTES # BLD AUTO: 8.8 % — LOW (ref 20.5–51.1)
MCHC RBC-ENTMCNC: 31.6 PG — HIGH (ref 27–31)
MCHC RBC-ENTMCNC: 34.6 G/DL — SIGNIFICANT CHANGE UP (ref 32–37)
MCV RBC AUTO: 91.3 FL — SIGNIFICANT CHANGE UP (ref 81–99)
MONOCYTES # BLD AUTO: 0.32 K/UL — SIGNIFICANT CHANGE UP (ref 0.1–0.6)
MONOCYTES NFR BLD AUTO: 7 % — SIGNIFICANT CHANGE UP (ref 1.7–9.3)
NEUTROPHILS # BLD AUTO: 3.76 K/UL — SIGNIFICANT CHANGE UP (ref 1.4–6.5)
NEUTROPHILS NFR BLD AUTO: 82.3 % — HIGH (ref 42.2–75.2)
NRBC BLD AUTO-RTO: 0 /100 WBCS — SIGNIFICANT CHANGE UP (ref 0–0)
PLATELET # BLD AUTO: 101 K/UL — LOW (ref 130–400)
PMV BLD: 12.7 FL — HIGH (ref 7.4–10.4)
POTASSIUM SERPL-MCNC: 3.6 MMOL/L — SIGNIFICANT CHANGE UP (ref 3.5–5)
POTASSIUM SERPL-SCNC: 3.6 MMOL/L — SIGNIFICANT CHANGE UP (ref 3.5–5)
PROT SERPL-MCNC: 7 G/DL — SIGNIFICANT CHANGE UP (ref 6–8)
RBC # BLD: 4.69 M/UL — SIGNIFICANT CHANGE UP (ref 4.2–5.4)
RBC # FLD: 11.9 % — SIGNIFICANT CHANGE UP (ref 11.5–14.5)
SODIUM SERPL-SCNC: 136 MMOL/L — SIGNIFICANT CHANGE UP (ref 135–146)
WBC # BLD: 4.57 K/UL — LOW (ref 4.8–10.8)
WBC # FLD AUTO: 4.57 K/UL — LOW (ref 4.8–10.8)

## 2025-04-26 PROCEDURE — 99284 EMERGENCY DEPT VISIT MOD MDM: CPT | Mod: 25

## 2025-04-26 PROCEDURE — 70498 CT ANGIOGRAPHY NECK: CPT | Mod: MC

## 2025-04-26 PROCEDURE — 70450 CT HEAD/BRAIN W/O DYE: CPT | Mod: MC

## 2025-04-26 PROCEDURE — 70496 CT ANGIOGRAPHY HEAD: CPT | Mod: 26

## 2025-04-26 PROCEDURE — 85025 COMPLETE CBC W/AUTO DIFF WBC: CPT

## 2025-04-26 PROCEDURE — 70450 CT HEAD/BRAIN W/O DYE: CPT | Mod: 26,59

## 2025-04-26 PROCEDURE — 99285 EMERGENCY DEPT VISIT HI MDM: CPT

## 2025-04-26 PROCEDURE — 96374 THER/PROPH/DIAG INJ IV PUSH: CPT | Mod: XU

## 2025-04-26 PROCEDURE — 36415 COLL VENOUS BLD VENIPUNCTURE: CPT

## 2025-04-26 PROCEDURE — 70496 CT ANGIOGRAPHY HEAD: CPT | Mod: MC

## 2025-04-26 PROCEDURE — 96375 TX/PRO/DX INJ NEW DRUG ADDON: CPT | Mod: XU

## 2025-04-26 PROCEDURE — 70498 CT ANGIOGRAPHY NECK: CPT | Mod: 26

## 2025-04-26 PROCEDURE — 80053 COMPREHEN METABOLIC PANEL: CPT

## 2025-04-26 PROCEDURE — 84703 CHORIONIC GONADOTROPIN ASSAY: CPT

## 2025-04-26 RX ORDER — METOCLOPRAMIDE HCL 10 MG
10 TABLET ORAL ONCE
Refills: 0 | Status: COMPLETED | OUTPATIENT
Start: 2025-04-26 | End: 2025-04-26

## 2025-04-26 RX ORDER — KETOROLAC TROMETHAMINE 30 MG/ML
15 INJECTION, SOLUTION INTRAMUSCULAR; INTRAVENOUS ONCE
Refills: 0 | Status: DISCONTINUED | OUTPATIENT
Start: 2025-04-26 | End: 2025-04-26

## 2025-04-26 RX ORDER — ACETAMINOPHEN 500 MG/5ML
975 LIQUID (ML) ORAL ONCE
Refills: 0 | Status: COMPLETED | OUTPATIENT
Start: 2025-04-26 | End: 2025-04-26

## 2025-04-26 RX ORDER — METOCLOPRAMIDE HCL 10 MG
1 TABLET ORAL
Refills: 0
Start: 2025-04-26

## 2025-04-26 RX ORDER — METOCLOPRAMIDE HCL 10 MG
1 TABLET ORAL
Qty: 9 | Refills: 0
Start: 2025-04-26 | End: 2025-04-28

## 2025-04-26 RX ORDER — MAGNESIUM SULFATE 500 MG/ML
2 SYRINGE (ML) INJECTION ONCE
Refills: 0 | Status: COMPLETED | OUTPATIENT
Start: 2025-04-26 | End: 2025-04-26

## 2025-04-26 RX ADMIN — Medication 25 GRAM(S): at 13:14

## 2025-04-26 RX ADMIN — Medication 1000 MILLILITER(S): at 11:28

## 2025-04-26 RX ADMIN — Medication 104 MILLIGRAM(S): at 11:28

## 2025-04-26 RX ADMIN — KETOROLAC TROMETHAMINE 15 MILLIGRAM(S): 30 INJECTION, SOLUTION INTRAMUSCULAR; INTRAVENOUS at 13:14

## 2025-04-26 RX ADMIN — Medication 975 MILLIGRAM(S): at 11:28
